# Patient Record
Sex: FEMALE | Race: WHITE | NOT HISPANIC OR LATINO | URBAN - METROPOLITAN AREA
[De-identification: names, ages, dates, MRNs, and addresses within clinical notes are randomized per-mention and may not be internally consistent; named-entity substitution may affect disease eponyms.]

---

## 2023-04-07 ENCOUNTER — OFFICE VISIT (OUTPATIENT)
Dept: PRIMARY CARE | Facility: CLINIC | Age: 73
End: 2023-04-07
Payer: MEDICARE

## 2023-04-07 VITALS
HEART RATE: 82 BPM | DIASTOLIC BLOOD PRESSURE: 74 MMHG | WEIGHT: 217.6 LBS | RESPIRATION RATE: 22 BRPM | BODY MASS INDEX: 40.04 KG/M2 | OXYGEN SATURATION: 95 % | SYSTOLIC BLOOD PRESSURE: 115 MMHG | HEIGHT: 62 IN

## 2023-04-07 DIAGNOSIS — K59.00 CONSTIPATION, UNSPECIFIED CONSTIPATION TYPE: Primary | ICD-10-CM

## 2023-04-07 DIAGNOSIS — R10.84 GENERALIZED ABDOMINAL PAIN: ICD-10-CM

## 2023-04-07 DIAGNOSIS — R14.0 ABDOMINAL DISTENSION: ICD-10-CM

## 2023-04-07 PROBLEM — J45.909 ASTHMA (HHS-HCC): Status: ACTIVE | Noted: 2023-04-07

## 2023-04-07 PROBLEM — R73.03 PREDIABETES: Status: ACTIVE | Noted: 2023-04-07

## 2023-04-07 PROBLEM — R30.0 DYSURIA: Status: ACTIVE | Noted: 2023-04-07

## 2023-04-07 PROBLEM — M19.90 OSTEOARTHRITIS: Status: ACTIVE | Noted: 2023-04-07

## 2023-04-07 PROBLEM — I35.0 MILD AORTIC STENOSIS: Status: ACTIVE | Noted: 2023-04-07

## 2023-04-07 PROBLEM — R73.01 ELEVATED FASTING GLUCOSE: Status: ACTIVE | Noted: 2023-04-07

## 2023-04-07 PROBLEM — I10 HYPERTENSION: Status: ACTIVE | Noted: 2023-04-07

## 2023-04-07 PROBLEM — R01.1 MURMUR: Status: ACTIVE | Noted: 2023-04-07

## 2023-04-07 PROBLEM — R06.02 SHORTNESS OF BREATH: Status: ACTIVE | Noted: 2023-04-07

## 2023-04-07 PROBLEM — E66.9 OBESITY: Status: ACTIVE | Noted: 2023-04-07

## 2023-04-07 PROBLEM — M25.551 RIGHT HIP PAIN: Status: ACTIVE | Noted: 2023-04-07

## 2023-04-07 PROBLEM — F41.9 ANXIETY DISORDER: Status: ACTIVE | Noted: 2023-04-07

## 2023-04-07 PROBLEM — M54.50 LOW BACK PAIN: Status: ACTIVE | Noted: 2023-04-07

## 2023-04-07 PROBLEM — M16.11 OSTEOARTHRITIS OF RIGHT HIP: Status: ACTIVE | Noted: 2023-04-07

## 2023-04-07 PROBLEM — E55.9 VITAMIN D DEFICIENCY: Status: ACTIVE | Noted: 2023-04-07

## 2023-04-07 PROBLEM — E78.5 HYPERLIPIDEMIA: Status: ACTIVE | Noted: 2023-04-07

## 2023-04-07 PROCEDURE — 1159F MED LIST DOCD IN RCRD: CPT | Performed by: STUDENT IN AN ORGANIZED HEALTH CARE EDUCATION/TRAINING PROGRAM

## 2023-04-07 PROCEDURE — 99214 OFFICE O/P EST MOD 30 MIN: CPT | Performed by: STUDENT IN AN ORGANIZED HEALTH CARE EDUCATION/TRAINING PROGRAM

## 2023-04-07 PROCEDURE — 3078F DIAST BP <80 MM HG: CPT | Performed by: STUDENT IN AN ORGANIZED HEALTH CARE EDUCATION/TRAINING PROGRAM

## 2023-04-07 PROCEDURE — 3074F SYST BP LT 130 MM HG: CPT | Performed by: STUDENT IN AN ORGANIZED HEALTH CARE EDUCATION/TRAINING PROGRAM

## 2023-04-07 PROCEDURE — 1036F TOBACCO NON-USER: CPT | Performed by: STUDENT IN AN ORGANIZED HEALTH CARE EDUCATION/TRAINING PROGRAM

## 2023-04-07 PROCEDURE — 1160F RVW MEDS BY RX/DR IN RCRD: CPT | Performed by: STUDENT IN AN ORGANIZED HEALTH CARE EDUCATION/TRAINING PROGRAM

## 2023-04-07 RX ORDER — HYDROCODONE BITARTRATE AND ACETAMINOPHEN 5; 325 MG/1; MG/1
1 TABLET ORAL
COMMUNITY
Start: 2023-03-29

## 2023-04-07 RX ORDER — LISINOPRIL 10 MG/1
1 TABLET ORAL DAILY
COMMUNITY
Start: 2019-04-16 | End: 2023-09-08 | Stop reason: SDUPTHER

## 2023-04-07 RX ORDER — HYDROCHLOROTHIAZIDE 25 MG/1
1 TABLET ORAL DAILY
COMMUNITY
Start: 2013-06-17 | End: 2023-09-08 | Stop reason: SDUPTHER

## 2023-04-07 RX ORDER — MOMETASONE FUROATE AND FORMOTEROL FUMARATE DIHYDRATE 200; 5 UG/1; UG/1
2 AEROSOL RESPIRATORY (INHALATION) 2 TIMES DAILY
COMMUNITY
Start: 2022-05-31

## 2023-04-07 RX ORDER — GABAPENTIN 300 MG/1
1 CAPSULE ORAL DAILY
COMMUNITY
Start: 2020-09-22 | End: 2023-09-08 | Stop reason: SDUPTHER

## 2023-04-07 RX ORDER — CITALOPRAM 20 MG/1
1 TABLET, FILM COATED ORAL DAILY
COMMUNITY
Start: 2013-01-16 | End: 2023-09-08 | Stop reason: SDUPTHER

## 2023-04-07 RX ORDER — DEXTROMETHORPHAN POLISTIREX 30 MG/5 ML
1 SUSPENSION, EXTENDED RELEASE 12 HR ORAL ONCE
Qty: 266 ML | Refills: 0 | Status: SHIPPED | OUTPATIENT
Start: 2023-04-07 | End: 2023-04-07

## 2023-04-07 RX ORDER — MELOXICAM 15 MG/1
1 TABLET ORAL DAILY
COMMUNITY
Start: 2020-05-01 | End: 2023-09-08 | Stop reason: SDUPTHER

## 2023-04-07 RX ORDER — VERAPAMIL HYDROCHLORIDE 240 MG/1
1 CAPSULE, EXTENDED RELEASE ORAL DAILY
COMMUNITY
Start: 2022-07-18 | End: 2023-07-20 | Stop reason: SDUPTHER

## 2023-04-07 RX ORDER — ACETAMINOPHEN 500 MG
650 TABLET ORAL EVERY 8 HOURS PRN
COMMUNITY

## 2023-04-07 RX ORDER — ATORVASTATIN CALCIUM 20 MG/1
1 TABLET, FILM COATED ORAL NIGHTLY
COMMUNITY
Start: 2012-04-12 | End: 2023-09-08 | Stop reason: SDUPTHER

## 2023-04-07 RX ORDER — POLYETHYLENE GLYCOL 3350 17 G/17G
17 POWDER, FOR SOLUTION ORAL DAILY
Qty: 595 G | Refills: 0 | Status: SHIPPED | OUTPATIENT
Start: 2023-04-07

## 2023-04-07 RX ORDER — ALBUTEROL SULFATE 90 UG/1
AEROSOL, METERED RESPIRATORY (INHALATION)
COMMUNITY
Start: 2012-09-10

## 2023-04-07 RX ORDER — SEMAGLUTIDE 2.68 MG/ML
2 INJECTION, SOLUTION SUBCUTANEOUS
COMMUNITY
Start: 2022-10-25 | End: 2023-05-15 | Stop reason: SDUPTHER

## 2023-04-07 ASSESSMENT — ENCOUNTER SYMPTOMS
DYSURIA: 0
ABDOMINAL PAIN: 1
DIARRHEA: 1
SHORTNESS OF BREATH: 0
RHINORRHEA: 0
CONSTIPATION: 1
MYALGIAS: 0
FATIGUE: 0
NAUSEA: 0
COUGH: 0
CHILLS: 0
ARTHRALGIAS: 0
FEVER: 0
VOMITING: 0

## 2023-04-07 ASSESSMENT — PAIN SCALES - GENERAL: PAINLEVEL: 4

## 2023-04-07 NOTE — PROGRESS NOTES
"Subjective   Patient ID: Ghazal Mancini is a 73 y.o. female who presents for Abdominal Pain (9:00 Tuesday night, bowel impaction/disimpacted around 2p Wednesday; experiencing  sharp pain/discomfort).    Abdominal Pain  Associated symptoms include constipation and diarrhea. Pertinent negatives include no arthralgias, dysuria, fever, myalgias, nausea or vomiting.        She was in the ER 3 days ago for impaction. This was in [facility]. She was able to eliminate on her own and then left the ER. She was not even seen and no tests were done.  She was able to sleep and had some diarrhea.  She drove back home here and she is still having abdominal pain.  She has been using hemorrhoid cream.  She does not take anything for constipation.    Review of Systems   Constitutional:  Negative for chills, fatigue and fever.   HENT:  Negative for congestion and rhinorrhea.    Respiratory:  Negative for cough and shortness of breath.    Cardiovascular:  Negative for chest pain.   Gastrointestinal:  Positive for abdominal pain, constipation and diarrhea. Negative for nausea and vomiting.   Genitourinary:  Negative for dysuria.   Musculoskeletal:  Negative for arthralgias and myalgias.       Objective   /74   Pulse 82   Resp 22   Ht 1.575 m (5' 2\")   Wt 98.7 kg (217 lb 9.6 oz)   SpO2 95%   BMI 39.80 kg/m²     Physical Exam  Vitals and nursing note reviewed.   Constitutional:       General: She is in acute distress.      Appearance: Normal appearance. She is ill-appearing and toxic-appearing.      Comments: Appears uncomfortable.   HENT:      Head: Normocephalic and atraumatic.   Cardiovascular:      Rate and Rhythm: Normal rate and regular rhythm.      Heart sounds: Normal heart sounds.   Pulmonary:      Effort: Pulmonary effort is normal.      Breath sounds: Normal breath sounds.   Abdominal:      General: Bowel sounds are normal. There is distension (Mild).      Palpations: Abdomen is soft.      Tenderness: There is " abdominal tenderness (Generalized.  Particularly in distribution of last portion of colon). There is no guarding or rebound.         Assessment/Plan   Problem List Items Addressed This Visit    None  Visit Diagnoses       Constipation, unspecified constipation type    -  Primary    Relevant Medications    mineral oil enema    polyethylene glycol (Glycolax) 17 gram/dose powder    Other Relevant Orders    XR abdomen 1 view    Generalized abdominal pain        Relevant Medications    mineral oil enema    Other Relevant Orders    XR abdomen 1 view    Abdominal distension        Relevant Medications    mineral oil enema    Other Relevant Orders    XR abdomen 1 view          Patient with increasing constipation.  History of impaction.  Was seen in the ER but left without being seen as she did have a bowel movement.  Ordered KUB to assess for stool burden.  Discussed continued use of MiraLAX to relieve constipation.  We will first perform mineral oil enema as ordered.  Patient can get this at the pharmacy.  Discussed other over-the-counter medications as well as dietary modifications and changes to help relieve symptoms in the future.  Discussed signs and symptoms that would require reevaluation in the office versus immediate treatment in the emergency department.  She is understanding and agreeable with plan.

## 2023-05-15 ENCOUNTER — TELEMEDICINE (OUTPATIENT)
Dept: PHARMACY | Facility: HOSPITAL | Age: 73
End: 2023-05-15
Payer: MEDICARE

## 2023-05-15 DIAGNOSIS — R73.01 ELEVATED FASTING GLUCOSE: Primary | ICD-10-CM

## 2023-05-15 DIAGNOSIS — R73.01 ELEVATED FASTING GLUCOSE: ICD-10-CM

## 2023-05-15 RX ORDER — SEMAGLUTIDE 2.68 MG/ML
2 INJECTION, SOLUTION SUBCUTANEOUS
Qty: 9 ML | Refills: 0 | Status: SHIPPED | OUTPATIENT
Start: 2023-05-15 | End: 2023-09-13 | Stop reason: ALTCHOICE

## 2023-05-15 NOTE — PROGRESS NOTES
Subjective   Patient ID: Ghazal Mancini is a 73 y.o. female who presents for Weight Loss and Hyperglycemia.    Referring Provider: Becka Foreman DO     Pt presents to the pharmacy team for Ozempic f/u that can assist with weight loss. Per her insurance, medications such as Wegovy, Saxenda, Contrave, Qsymia are not covered. She started Ozempic 9 months ago, titrated up to the 2 mg dose early in December. No issues or adverse effects after taking the 2 mg dose. She has noticed a change in weight loss, now down to 217 lbs since last office visit this past month, a decrease of 28 lbs. Some of this weight loss pt attributes to activity change. No other concerns at this time    Objective     There were no vitals taken for this visit.     Labs  Lab Results   Component Value Date    BILITOT 0.5 07/15/2022    CALCIUM 10.1 07/15/2022    CO2 28 07/15/2022     07/15/2022    CREATININE 0.91 07/15/2022    GLUCOSE 95 07/15/2022    ALKPHOS 68 07/15/2022    K 4.8 07/15/2022    PROT 7.1 07/15/2022     07/15/2022    AST 26 07/15/2022    ALT 25 07/15/2022    BUN 22 07/15/2022    ANIONGAP 16 07/15/2022    ALBUMIN 4.3 07/15/2022    GFRF 66 07/15/2022     Lab Results   Component Value Date    TRIG 180 (H) 07/15/2022    CHOL 151 07/15/2022    HDL 44.4 07/15/2022     Lab Results   Component Value Date    HGBA1C 6.2 03/25/2021       Current Outpatient Medications on File Prior to Visit   Medication Sig Dispense Refill    acetaminophen (Tylenol) 500 mg tablet Take 650 mg by mouth every 8 hours if needed.      albuterol 90 mcg/actuation inhaler INHALE 1 TO 2 PUFFS EVERY 4 TO 6 HOURS AS NEEDED.      atorvastatin (Lipitor) 20 mg tablet Take 1 tablet (20 mg) by mouth once daily at bedtime.      citalopram (CeleXA) 20 mg tablet Take 1 tablet (20 mg) by mouth once daily.      gabapentin (Neurontin) 300 mg capsule Take 1 capsule (300 mg) by mouth once daily.      hydroCHLOROthiazide (HYDRODiuril) 25 mg tablet Take 1 tablet (25 mg)  by mouth once daily.      HYDROcodone-acetaminophen (Norco) 5-325 mg tablet Take 1 tablet by mouth. Two to three times daily      lisinopril 10 mg tablet Take 1 tablet (10 mg) by mouth once daily.      meloxicam (Mobic) 15 mg tablet Take 1 tablet (15 mg) by mouth once daily.      mometasone-formoterol (Dulera) 200-5 mcg/actuation inhaler Inhale 2 puffs  in the morning and 2 puffs in the evening.      polyethylene glycol (Glycolax) 17 gram/dose powder Take 17 g by mouth once daily. 595 g 0    semaglutide (Ozempic) 2 mg/dose (8 mg/3 mL) pen injector Inject 2 mg under the skin 1 (one) time per week.      verapamil ER (Veralan PM) 240 mg 24 hr capsule Take 1 capsule (240 mg) by mouth once daily.       No current facility-administered medications on file prior to visit.        Assessment/Plan   Problem List Items Addressed This Visit       Elevated fasting glucose     1. Continue taking Ozempic 2 mg weekly indefinitely, pt tolerating well  2. Continue all other meds as prescribed by PCP    Clinical Pharmacist follow-up: as needed    Dustin Forbes, PharmD, [facility]  Clinical Pharmacist     Verbal consent to manage patient’s drug therapy was obtained from the patient. They were informed they may decline to participate or withdraw from participation in pharmacy services at any time.

## 2023-05-19 ENCOUNTER — APPOINTMENT (OUTPATIENT)
Dept: PRIMARY CARE | Facility: CLINIC | Age: 73
End: 2023-05-19
Payer: MEDICARE

## 2023-05-26 ENCOUNTER — OFFICE VISIT (OUTPATIENT)
Dept: PRIMARY CARE | Facility: CLINIC | Age: 73
End: 2023-05-26
Payer: MEDICARE

## 2023-05-26 VITALS
HEART RATE: 75 BPM | WEIGHT: 230 LBS | SYSTOLIC BLOOD PRESSURE: 142 MMHG | DIASTOLIC BLOOD PRESSURE: 60 MMHG | BODY MASS INDEX: 42.07 KG/M2

## 2023-05-26 DIAGNOSIS — B35.6 TINEA CRURIS: ICD-10-CM

## 2023-05-26 DIAGNOSIS — F41.1 GENERALIZED ANXIETY DISORDER: Primary | ICD-10-CM

## 2023-05-26 PROBLEM — M15.9 GENERALIZED OSTEOARTHROSIS: Status: ACTIVE | Noted: 2019-10-15

## 2023-05-26 PROBLEM — M79.10 MUSCLE PAIN: Status: ACTIVE | Noted: 2023-05-26

## 2023-05-26 PROBLEM — M54.16 LUMBAR RADICULOPATHY: Status: ACTIVE | Noted: 2023-05-26

## 2023-05-26 PROBLEM — M81.0 AGE RELATED OSTEOPOROSIS: Status: ACTIVE | Noted: 2019-10-15

## 2023-05-26 PROBLEM — G89.4 CHRONIC PAIN SYNDROME: Status: ACTIVE | Noted: 2023-05-26

## 2023-05-26 PROBLEM — F43.23 ADJUSTMENT DISORDER WITH MIXED ANXIETY AND DEPRESSED MOOD: Status: ACTIVE | Noted: 2023-05-26

## 2023-05-26 PROCEDURE — 1159F MED LIST DOCD IN RCRD: CPT | Performed by: FAMILY MEDICINE

## 2023-05-26 PROCEDURE — 1160F RVW MEDS BY RX/DR IN RCRD: CPT | Performed by: FAMILY MEDICINE

## 2023-05-26 PROCEDURE — 99214 OFFICE O/P EST MOD 30 MIN: CPT | Performed by: FAMILY MEDICINE

## 2023-05-26 PROCEDURE — 1036F TOBACCO NON-USER: CPT | Performed by: FAMILY MEDICINE

## 2023-05-26 PROCEDURE — 3077F SYST BP >= 140 MM HG: CPT | Performed by: FAMILY MEDICINE

## 2023-05-26 PROCEDURE — 3078F DIAST BP <80 MM HG: CPT | Performed by: FAMILY MEDICINE

## 2023-05-26 RX ORDER — NYSTATIN 100000 [USP'U]/G
POWDER TOPICAL 2 TIMES DAILY
Qty: 60 G | Refills: 3 | Status: SHIPPED | OUTPATIENT
Start: 2023-05-26 | End: 2024-01-18 | Stop reason: SDUPTHER

## 2023-05-26 RX ORDER — BUPROPION HYDROCHLORIDE 150 MG/1
150 TABLET ORAL EVERY MORNING
Qty: 30 TABLET | Refills: 5 | Status: SHIPPED | OUTPATIENT
Start: 2023-05-26 | End: 2023-11-15 | Stop reason: SDUPTHER

## 2023-05-26 ASSESSMENT — ENCOUNTER SYMPTOMS
PALPITATIONS: 0
FEVER: 0
FACIAL ASYMMETRY: 0
NERVOUS/ANXIOUS: 1
ACTIVITY CHANGE: 0
AGITATION: 1
BACK PAIN: 0
ARTHRALGIAS: 0
FATIGUE: 0
APPETITE CHANGE: 0
LIGHT-HEADEDNESS: 0
COUGH: 0
CHEST TIGHTNESS: 0
CHOKING: 0
COLOR CHANGE: 0
HEADACHES: 0
DIZZINESS: 0

## 2023-05-26 NOTE — PROGRESS NOTES
"Subjective   Patient ID: Ghazal Mancini is a 73 y.o. female who presents for To discuss mood.     HPI   Patient reports \"I have the personality of a snake.\" She is upset because of her property is being used by a family friend for her wedding. She is upset about everything that is happening.     Review of Systems   Constitutional:  Negative for activity change, appetite change, fatigue and fever.   HENT:  Negative for congestion.    Respiratory:  Negative for cough, choking and chest tightness.    Cardiovascular:  Negative for chest pain, palpitations and leg swelling.   Musculoskeletal:  Negative for arthralgias, back pain and gait problem.   Skin:  Negative for color change and pallor.   Neurological:  Negative for dizziness, facial asymmetry, light-headedness and headaches.   Psychiatric/Behavioral:  Positive for agitation. Negative for behavioral problems. The patient is nervous/anxious.        Objective   /60 (BP Location: Right arm)   Pulse 75   Wt 104 kg (230 lb)   BMI 42.07 kg/m²   BSA Body surface area is 2.13 meters squared.      Physical Exam  Constitutional:       General: She is not in acute distress.     Appearance: Normal appearance. She is not toxic-appearing.   HENT:      Head: Normocephalic.      Right Ear: Tympanic membrane, ear canal and external ear normal.      Left Ear: Tympanic membrane, ear canal and external ear normal.      Mouth/Throat:      Pharynx: Oropharynx is clear.   Eyes:      Conjunctiva/sclera: Conjunctivae normal.      Pupils: Pupils are equal, round, and reactive to light.   Cardiovascular:      Rate and Rhythm: Normal rate and regular rhythm.      Pulses: Normal pulses.      Heart sounds: Normal heart sounds.   Pulmonary:      Effort: No respiratory distress.      Breath sounds: No wheezing, rhonchi or rales.   Musculoskeletal:         General: No swelling or tenderness.      Cervical back: No tenderness.   Skin:     Findings: No lesion or rash.   Neurological:    "   General: No focal deficit present.      Mental Status: She is alert and oriented to person, place, and time. Mental status is at baseline.      Gait: Gait normal.   Psychiatric:         Mood and Affect: Mood normal.         Behavior: Behavior normal.         Thought Content: Thought content normal.         Judgment: Judgment normal.       Legacy Encounter on 07/15/2022   Component Date Value Ref Range Status    Glucose 07/15/2022 95  74 - 99 mg/dL Final    Sodium 07/15/2022 139  136 - 145 mmol/L Final    Potassium 07/15/2022 4.8  3.5 - 5.3 mmol/L Final    Chloride 07/15/2022 100  98 - 107 mmol/L Final    Bicarbonate 07/15/2022 28  21 - 32 mmol/L Final    Anion Gap 07/15/2022 16  10 - 20 mmol/L Final    Urea Nitrogen 07/15/2022 22  6 - 23 mg/dL Final    Creatinine 07/15/2022 0.91  0.50 - 1.05 mg/dL Final    GFR Female 07/15/2022 66  >90 mL/min/1.73m2 Final    Comment:  CALCULATIONS OF ESTIMATED GFR ARE PERFORMED   USING THE 2021 CKD-EPI STUDY REFIT EQUATION   WITHOUT THE RACE VARIABLE FOR THE IDMS-TRACEABLE   CREATININE METHODS.    https://jasn.asnjournals.org/content/early/2021/09/22/ASN.3088526484      Calcium 07/15/2022 10.1  8.6 - 10.6 mg/dL Final    Albumin 07/15/2022 4.3  3.4 - 5.0 g/dL Final    Alkaline Phosphatase 07/15/2022 68  33 - 136 U/L Final    Total Protein 07/15/2022 7.1  6.4 - 8.2 g/dL Final    AST 07/15/2022 26  9 - 39 U/L Final    Total Bilirubin 07/15/2022 0.5  0.0 - 1.2 mg/dL Final    ALT (SGPT) 07/15/2022 25  7 - 45 U/L Final    Comment:  Patients treated with Sulfasalazine may generate    falsely decreased results for ALT.      Cholesterol 07/15/2022 151  0 - 199 mg/dL Final    Comment: .      AGE      DESIRABLE   BORDERLINE HIGH   HIGH     0-19 Y     0 - 169       170 - 199     >/= 200    20-24 Y     0 - 189       190 - 224     >/= 225         >24 Y     0 - 199       200 - 239     >/= 240   **All ranges are based on fasting samples. Specific   therapeutic targets will vary based on  patient-specific   cardiac risk.  .   Pediatric guidelines reference:Pediatrics 2011, 128(S5).   Adult guidelines reference: NCEP ATPIII Guidelines,     PALOMO 2001, 258:2486-97  .   Venipuncture immediately after or during the    administration of Metamizole may lead to falsely   low results. Testing should be performed immediately   prior to Metamizole dosing.      HDL 07/15/2022 44.4  mg/dL Final    Comment: .      AGE      VERY LOW   LOW     NORMAL    HIGH       0-19 Y       < 35   < 40     40-45     ----    20-24 Y       ----   < 40       >45     ----      >24 Y       ----   < 40     40-60      >60  .      Cholesterol/HDL Ratio 07/15/2022 3.4   Final    Comment: REF VALUES  DESIRABLE  < 3.4  HIGH RISK  > 5.0      LDL 07/15/2022 71  0 - 99 mg/dL Final    Comment: .                           NEAR      BORD      AGE      DESIRABLE  OPTIMAL    HIGH     HIGH     VERY HIGH     0-19 Y     0 - 109     ---    110-129   >/= 130     ----    20-24 Y     0 - 119     ---    120-159   >/= 160     ----      >24 Y     0 -  99   100-129  130-159   160-189     >/=190  .      VLDL 07/15/2022 36  0 - 40 mg/dL Final    Triglycerides 07/15/2022 180 (H)  0 - 149 mg/dL Final    Comment: .      AGE      DESIRABLE   BORDERLINE HIGH   HIGH     VERY HIGH   0 D-90 D    19 - 174         ----         ----        ----  91 D- 9 Y     0 -  74        75 -  99     >/= 100      ----    10-19 Y     0 -  89        90 - 129     >/= 130      ----    20-24 Y     0 - 114       115 - 149     >/= 150      ----         >24 Y     0 - 149       150 - 199    200- 499    >/= 500  .   Venipuncture immediately after or during the    administration of Metamizole may lead to falsely   low results. Testing should be performed immediately   prior to Metamizole dosing.      Vitamin D, 25-Hydroxy 07/15/2022 42  ng/mL Final    Comment: .  DEFICIENCY:         < 20   NG/ML  INSUFFICIENCY:      20-29  NG/ML  SUFFICIENCY:         NG/ML    THIS ASSAY ACCURATELY  QUANTIFIES THE SUM OF  VITAMIN D3, 25-HYDROXY AND VIT D2,25-HYDROXY.      TSH 07/15/2022 1.73  0.44 - 3.98 mIU/L Final    Comment:  TSH testing is performed using different testing    methodology at [facility] than at other    [facility]. Direct result comparisons should    only be made within the same method.      WBC 07/15/2022 6.0  4.4 - 11.3 x10E9/L Final    nRBC 07/15/2022 0.0  0.0 - 0.0 /100 WBC Final    RBC 07/15/2022 4.22  4.00 - 5.20 x10E12/L Final    Hemoglobin 07/15/2022 12.4  12.0 - 16.0 g/dL Final    Hematocrit 07/15/2022 39.2  36.0 - 46.0 % Final    MCV 07/15/2022 93  80 - 100 fL Final    MCHC 07/15/2022 31.6 (L)  32.0 - 36.0 g/dL Final    Platelets 07/15/2022 242  150 - 450 x10E9/L Final    RDW 07/15/2022 13.8  11.5 - 14.5 % Final    Neutrophils % 07/15/2022 62.3  40.0 - 80.0 % Final    Immature Granulocytes %, Automated 07/15/2022 0.2  0.0 - 0.9 % Final    Comment:  Immature Granulocyte Count (IG) includes promyelocytes,    myelocytes and metamyelocytes but does not include bands.   Percent differential counts (%) should be interpreted in the   context of the absolute cell counts (cells/L).      Lymphocytes % 07/15/2022 27.5  13.0 - 44.0 % Final    Monocytes % 07/15/2022 8.5  2.0 - 10.0 % Final    Eosinophils % 07/15/2022 0.7  0.0 - 6.0 % Final    Basophils % 07/15/2022 0.8  0.0 - 2.0 % Final    Neutrophils Absolute 07/15/2022 3.76  1.60 - 5.50 x10E9/L Final    Lymphocytes Absolute 07/15/2022 1.66  0.80 - 3.00 x10E9/L Final    Monocytes Absolute 07/15/2022 0.51  0.05 - 0.80 x10E9/L Final    Eosinophils Absolute 07/15/2022 0.04  0.00 - 0.40 x10E9/L Final    Basophils Absolute 07/15/2022 0.05  0.00 - 0.10 x10E9/L Final     Current Outpatient Medications on File Prior to Visit   Medication Sig Dispense Refill    acetaminophen (Tylenol) 500 mg tablet Take 650 mg by mouth every 8 hours if needed.      albuterol 90 mcg/actuation inhaler INHALE 1 TO 2 PUFFS EVERY 4 TO 6 HOURS AS NEEDED.       atorvastatin (Lipitor) 20 mg tablet Take 1 tablet (20 mg) by mouth once daily at bedtime.      citalopram (CeleXA) 20 mg tablet Take 1 tablet (20 mg) by mouth once daily.      gabapentin (Neurontin) 300 mg capsule Take 1 capsule (300 mg) by mouth once daily.      hydroCHLOROthiazide (HYDRODiuril) 25 mg tablet Take 1 tablet (25 mg) by mouth once daily.      HYDROcodone-acetaminophen (Norco) 5-325 mg tablet Take 1 tablet by mouth. Two to three times daily      lisinopril 10 mg tablet Take 1 tablet (10 mg) by mouth once daily.      meloxicam (Mobic) 15 mg tablet Take 1 tablet (15 mg) by mouth once daily.      polyethylene glycol (Glycolax) 17 gram/dose powder Take 17 g by mouth once daily. 595 g 0    semaglutide (Ozempic) 2 mg/dose (8 mg/3 mL) pen injector Inject 2 mg under the skin 1 (one) time per week. 9 mL 0    verapamil ER (Veralan PM) 240 mg 24 hr capsule Take 1 capsule (240 mg) by mouth once daily.      mometasone-formoterol (Dulera) 200-5 mcg/actuation inhaler Inhale 2 puffs twice a day.       No current facility-administered medications on file prior to visit.     No images are attached to the encounter.            Assessment/Plan

## 2023-06-30 ENCOUNTER — TELEPHONE (OUTPATIENT)
Dept: PRIMARY CARE | Facility: CLINIC | Age: 73
End: 2023-06-30
Payer: MEDICARE

## 2023-06-30 NOTE — TELEPHONE ENCOUNTER
Please schedule patient for [facility] wellness visit after 7/19/2023.    Thank you-  Emanuel Diamond CMA  6/30/2023  Practice Supervisor  [facility]

## 2023-07-03 ENCOUNTER — TELEPHONE (OUTPATIENT)
Dept: PRIMARY CARE | Facility: CLINIC | Age: 73
End: 2023-07-03
Payer: MEDICARE

## 2023-07-03 DIAGNOSIS — I10 HYPERTENSION, UNSPECIFIED TYPE: ICD-10-CM

## 2023-07-03 DIAGNOSIS — R73.03 PREDIABETES: ICD-10-CM

## 2023-07-03 DIAGNOSIS — R73.01 ELEVATED FASTING GLUCOSE: ICD-10-CM

## 2023-07-03 DIAGNOSIS — M19.90 OSTEOARTHRITIS, UNSPECIFIED OSTEOARTHRITIS TYPE, UNSPECIFIED SITE: ICD-10-CM

## 2023-07-03 DIAGNOSIS — E78.5 HYPERLIPIDEMIA, UNSPECIFIED HYPERLIPIDEMIA TYPE: ICD-10-CM

## 2023-07-03 DIAGNOSIS — M81.0 AGE RELATED OSTEOPOROSIS, UNSPECIFIED PATHOLOGICAL FRACTURE PRESENCE: ICD-10-CM

## 2023-07-03 DIAGNOSIS — E55.9 VITAMIN D DEFICIENCY: ICD-10-CM

## 2023-07-20 DIAGNOSIS — I10 HYPERTENSION, UNSPECIFIED TYPE: ICD-10-CM

## 2023-07-20 RX ORDER — VERAPAMIL HYDROCHLORIDE 240 MG/1
240 CAPSULE, EXTENDED RELEASE ORAL DAILY
Qty: 90 CAPSULE | Refills: 0 | Status: SHIPPED | OUTPATIENT
Start: 2023-07-20 | End: 2023-10-23 | Stop reason: SDUPTHER

## 2023-07-20 NOTE — TELEPHONE ENCOUNTER
Refill request for Verapamil  mg, 1 cap daily. # 90     Pended to [facility].     Has upcoming appt next month.

## 2023-08-08 ENCOUNTER — APPOINTMENT (OUTPATIENT)
Dept: PRIMARY CARE | Facility: CLINIC | Age: 73
End: 2023-08-08
Payer: MEDICARE

## 2023-09-05 ENCOUNTER — APPOINTMENT (OUTPATIENT)
Dept: PRIMARY CARE | Facility: CLINIC | Age: 73
End: 2023-09-05
Payer: MEDICARE

## 2023-09-08 ENCOUNTER — TELEPHONE (OUTPATIENT)
Dept: PRIMARY CARE | Facility: CLINIC | Age: 73
End: 2023-09-08
Payer: MEDICARE

## 2023-09-08 DIAGNOSIS — M54.16 LUMBAR RADICULOPATHY: ICD-10-CM

## 2023-09-08 DIAGNOSIS — I10 HYPERTENSION, UNSPECIFIED TYPE: ICD-10-CM

## 2023-09-08 DIAGNOSIS — E78.5 HYPERLIPIDEMIA, UNSPECIFIED HYPERLIPIDEMIA TYPE: ICD-10-CM

## 2023-09-08 DIAGNOSIS — F41.9 ANXIETY DISORDER, UNSPECIFIED TYPE: ICD-10-CM

## 2023-09-08 RX ORDER — CITALOPRAM 20 MG/1
20 TABLET, FILM COATED ORAL DAILY
Qty: 90 TABLET | Refills: 0 | Status: SHIPPED | OUTPATIENT
Start: 2023-09-08 | End: 2023-12-15

## 2023-09-08 RX ORDER — ATORVASTATIN CALCIUM 20 MG/1
20 TABLET, FILM COATED ORAL NIGHTLY
Qty: 90 TABLET | Refills: 0 | Status: SHIPPED | OUTPATIENT
Start: 2023-09-08 | End: 2023-12-15

## 2023-09-08 RX ORDER — HYDROCHLOROTHIAZIDE 25 MG/1
25 TABLET ORAL DAILY
Qty: 90 TABLET | Refills: 0 | Status: SHIPPED | OUTPATIENT
Start: 2023-09-08 | End: 2023-12-15

## 2023-09-08 RX ORDER — LISINOPRIL 10 MG/1
10 TABLET ORAL DAILY
Qty: 90 TABLET | Refills: 0 | Status: SHIPPED | OUTPATIENT
Start: 2023-09-08 | End: 2023-12-15

## 2023-09-08 RX ORDER — GABAPENTIN 300 MG/1
300 CAPSULE ORAL DAILY
Qty: 90 CAPSULE | Refills: 0 | Status: SHIPPED | OUTPATIENT
Start: 2023-09-08 | End: 2023-12-15

## 2023-09-08 RX ORDER — MELOXICAM 15 MG/1
15 TABLET ORAL DAILY
Qty: 90 TABLET | Refills: 0 | Status: SHIPPED | OUTPATIENT
Start: 2023-09-08 | End: 2023-12-15

## 2023-09-08 NOTE — TELEPHONE ENCOUNTER
Meloxicam 15mg 1x day qty 90  Gabapentin 300 mg 1 x day qty 90  Lisinopril 10 mg 1x day aty 90  Hydrochorot 25 mg 1x day qty 90  Atorvastatin 20 mg 1 at bed time qty 90  Citalopram 20 mg 1x day qty 90  [facility]

## 2023-09-11 ENCOUNTER — APPOINTMENT (OUTPATIENT)
Dept: PRIMARY CARE | Facility: CLINIC | Age: 73
End: 2023-09-11
Payer: MEDICARE

## 2023-09-13 DIAGNOSIS — R73.01 ELEVATED FASTING GLUCOSE: Primary | ICD-10-CM

## 2023-09-13 RX ORDER — SEMAGLUTIDE 2.68 MG/ML
2 INJECTION, SOLUTION SUBCUTANEOUS
Qty: 9 ML | Refills: 1 | Status: SHIPPED | OUTPATIENT
Start: 2023-09-13 | End: 2024-03-27

## 2023-09-19 ENCOUNTER — APPOINTMENT (OUTPATIENT)
Dept: PRIMARY CARE | Facility: CLINIC | Age: 73
End: 2023-09-19
Payer: MEDICARE

## 2023-10-13 ENCOUNTER — LAB (OUTPATIENT)
Dept: LAB | Facility: LAB | Age: 73
End: 2023-10-13
Payer: MEDICARE

## 2023-10-13 DIAGNOSIS — R73.01 ELEVATED FASTING GLUCOSE: ICD-10-CM

## 2023-10-13 DIAGNOSIS — I10 HYPERTENSION, UNSPECIFIED TYPE: ICD-10-CM

## 2023-10-13 DIAGNOSIS — M81.0 AGE RELATED OSTEOPOROSIS, UNSPECIFIED PATHOLOGICAL FRACTURE PRESENCE: ICD-10-CM

## 2023-10-13 DIAGNOSIS — R73.03 PREDIABETES: ICD-10-CM

## 2023-10-13 DIAGNOSIS — E78.5 HYPERLIPIDEMIA, UNSPECIFIED HYPERLIPIDEMIA TYPE: ICD-10-CM

## 2023-10-13 DIAGNOSIS — E55.9 VITAMIN D DEFICIENCY: ICD-10-CM

## 2023-10-13 PROCEDURE — 85025 COMPLETE CBC W/AUTO DIFF WBC: CPT

## 2023-10-13 PROCEDURE — 80061 LIPID PANEL: CPT

## 2023-10-13 PROCEDURE — 82306 VITAMIN D 25 HYDROXY: CPT

## 2023-10-13 PROCEDURE — 36415 COLL VENOUS BLD VENIPUNCTURE: CPT

## 2023-10-13 PROCEDURE — 83036 HEMOGLOBIN GLYCOSYLATED A1C: CPT

## 2023-10-13 PROCEDURE — 80053 COMPREHEN METABOLIC PANEL: CPT

## 2023-10-13 PROCEDURE — 84443 ASSAY THYROID STIM HORMONE: CPT

## 2023-10-14 LAB
25(OH)D3 SERPL-MCNC: 53 NG/ML (ref 30–100)
ALBUMIN SERPL BCP-MCNC: 4.5 G/DL (ref 3.4–5)
ALP SERPL-CCNC: 62 U/L (ref 33–136)
ALT SERPL W P-5'-P-CCNC: 20 U/L (ref 7–45)
ANION GAP SERPL CALC-SCNC: 15 MMOL/L (ref 10–20)
AST SERPL W P-5'-P-CCNC: 19 U/L (ref 9–39)
BASOPHILS # BLD AUTO: 0.03 X10*3/UL (ref 0–0.1)
BASOPHILS NFR BLD AUTO: 0.4 %
BILIRUB SERPL-MCNC: 0.4 MG/DL (ref 0–1.2)
BUN SERPL-MCNC: 23 MG/DL (ref 6–23)
CALCIUM SERPL-MCNC: 10.2 MG/DL (ref 8.6–10.6)
CHLORIDE SERPL-SCNC: 101 MMOL/L (ref 98–107)
CHOLEST SERPL-MCNC: 151 MG/DL (ref 0–199)
CHOLESTEROL/HDL RATIO: 3.8
CO2 SERPL-SCNC: 27 MMOL/L (ref 21–32)
CREAT SERPL-MCNC: 0.91 MG/DL (ref 0.5–1.05)
EOSINOPHIL # BLD AUTO: 0.01 X10*3/UL (ref 0–0.4)
EOSINOPHIL NFR BLD AUTO: 0.1 %
ERYTHROCYTE [DISTWIDTH] IN BLOOD BY AUTOMATED COUNT: 12.9 % (ref 11.5–14.5)
EST. AVERAGE GLUCOSE BLD GHB EST-MCNC: 117 MG/DL
GFR SERPL CREATININE-BSD FRML MDRD: 66 ML/MIN/1.73M*2
GLUCOSE SERPL-MCNC: 106 MG/DL (ref 74–99)
HBA1C MFR BLD: 5.7 %
HCT VFR BLD AUTO: 39.6 % (ref 36–46)
HDLC SERPL-MCNC: 40.1 MG/DL
HGB BLD-MCNC: 12.5 G/DL (ref 12–16)
IMM GRANULOCYTES # BLD AUTO: 0.02 X10*3/UL (ref 0–0.5)
IMM GRANULOCYTES NFR BLD AUTO: 0.3 % (ref 0–0.9)
LDLC SERPL CALC-MCNC: 73 MG/DL
LYMPHOCYTES # BLD AUTO: 1.8 X10*3/UL (ref 0.8–3)
LYMPHOCYTES NFR BLD AUTO: 25.8 %
MCH RBC QN AUTO: 30.1 PG (ref 26–34)
MCHC RBC AUTO-ENTMCNC: 31.6 G/DL (ref 32–36)
MCV RBC AUTO: 95 FL (ref 80–100)
MONOCYTES # BLD AUTO: 0.64 X10*3/UL (ref 0.05–0.8)
MONOCYTES NFR BLD AUTO: 9.2 %
NEUTROPHILS # BLD AUTO: 4.48 X10*3/UL (ref 1.6–5.5)
NEUTROPHILS NFR BLD AUTO: 64.2 %
NON HDL CHOLESTEROL: 111 MG/DL (ref 0–149)
NRBC BLD-RTO: 0 /100 WBCS (ref 0–0)
PLATELET # BLD AUTO: 306 X10*3/UL (ref 150–450)
PMV BLD AUTO: 9.1 FL (ref 7.5–11.5)
POTASSIUM SERPL-SCNC: 4.7 MMOL/L (ref 3.5–5.3)
PROT SERPL-MCNC: 7.2 G/DL (ref 6.4–8.2)
RBC # BLD AUTO: 4.15 X10*6/UL (ref 4–5.2)
SODIUM SERPL-SCNC: 138 MMOL/L (ref 136–145)
TRIGL SERPL-MCNC: 188 MG/DL (ref 0–149)
TSH SERPL-ACNC: 1.35 MIU/L (ref 0.44–3.98)
VLDL: 38 MG/DL (ref 0–40)
WBC # BLD AUTO: 7 X10*3/UL (ref 4.4–11.3)

## 2023-10-16 ENCOUNTER — LAB (OUTPATIENT)
Dept: LAB | Facility: LAB | Age: 73
End: 2023-10-16
Payer: MEDICARE

## 2023-10-16 ENCOUNTER — OFFICE VISIT (OUTPATIENT)
Dept: PRIMARY CARE | Facility: CLINIC | Age: 73
End: 2023-10-16
Payer: MEDICARE

## 2023-10-16 VITALS
HEIGHT: 61 IN | BODY MASS INDEX: 42.1 KG/M2 | HEART RATE: 86 BPM | WEIGHT: 223 LBS | DIASTOLIC BLOOD PRESSURE: 76 MMHG | SYSTOLIC BLOOD PRESSURE: 128 MMHG

## 2023-10-16 DIAGNOSIS — Z00.00 ROUTINE GENERAL MEDICAL EXAMINATION AT HEALTH CARE FACILITY: ICD-10-CM

## 2023-10-16 DIAGNOSIS — Z78.0 ASYMPTOMATIC MENOPAUSAL STATE: ICD-10-CM

## 2023-10-16 DIAGNOSIS — A19.2 TUBERCULOSIS GENERALIZED (EXCLUDING R70): Primary | ICD-10-CM

## 2023-10-16 DIAGNOSIS — Z71.89 CARDIAC RISK COUNSELING: ICD-10-CM

## 2023-10-16 DIAGNOSIS — Z12.31 ENCOUNTER FOR SCREENING MAMMOGRAM FOR BREAST CANCER: ICD-10-CM

## 2023-10-16 DIAGNOSIS — Z13.89 ENCOUNTER FOR SCREENING FOR OTHER DISORDER: ICD-10-CM

## 2023-10-16 DIAGNOSIS — Z71.89 ADVANCE DIRECTIVE DISCUSSED WITH PATIENT: ICD-10-CM

## 2023-10-16 DIAGNOSIS — Z00.00 HEALTHCARE MAINTENANCE: Primary | ICD-10-CM

## 2023-10-16 LAB
CREAT UR-MCNC: 40.4 MG/DL (ref 20–320)
MICROALBUMIN UR-MCNC: <7 MG/L
MICROALBUMIN/CREAT UR: NORMAL MG/G{CREAT}

## 2023-10-16 PROCEDURE — 82043 UR ALBUMIN QUANTITATIVE: CPT

## 2023-10-16 PROCEDURE — 1170F FXNL STATUS ASSESSED: CPT | Performed by: FAMILY MEDICINE

## 2023-10-16 PROCEDURE — 99214 OFFICE O/P EST MOD 30 MIN: CPT | Performed by: FAMILY MEDICINE

## 2023-10-16 PROCEDURE — G0444 DEPRESSION SCREEN ANNUAL: HCPCS | Performed by: FAMILY MEDICINE

## 2023-10-16 PROCEDURE — 3074F SYST BP LT 130 MM HG: CPT | Performed by: FAMILY MEDICINE

## 2023-10-16 PROCEDURE — 1125F AMNT PAIN NOTED PAIN PRSNT: CPT | Performed by: FAMILY MEDICINE

## 2023-10-16 PROCEDURE — 99497 ADVNCD CARE PLAN 30 MIN: CPT | Performed by: FAMILY MEDICINE

## 2023-10-16 PROCEDURE — 3077F SYST BP >= 140 MM HG: CPT | Performed by: FAMILY MEDICINE

## 2023-10-16 PROCEDURE — 1159F MED LIST DOCD IN RCRD: CPT | Performed by: FAMILY MEDICINE

## 2023-10-16 PROCEDURE — 1036F TOBACCO NON-USER: CPT | Performed by: FAMILY MEDICINE

## 2023-10-16 PROCEDURE — 1160F RVW MEDS BY RX/DR IN RCRD: CPT | Performed by: FAMILY MEDICINE

## 2023-10-16 PROCEDURE — G0439 PPPS, SUBSEQ VISIT: HCPCS | Performed by: FAMILY MEDICINE

## 2023-10-16 PROCEDURE — 3078F DIAST BP <80 MM HG: CPT | Performed by: FAMILY MEDICINE

## 2023-10-16 PROCEDURE — 1158F ADVNC CARE PLAN TLK DOCD: CPT | Performed by: FAMILY MEDICINE

## 2023-10-16 PROCEDURE — G0446 INTENS BEHAVE THER CARDIO DX: HCPCS | Performed by: FAMILY MEDICINE

## 2023-10-16 PROCEDURE — 82570 ASSAY OF URINE CREATININE: CPT

## 2023-10-16 ASSESSMENT — ENCOUNTER SYMPTOMS
HEADACHES: 0
DIZZINESS: 0
ACTIVITY CHANGE: 0
PALPITATIONS: 0
CHOKING: 0
COUGH: 0
OCCASIONAL FEELINGS OF UNSTEADINESS: 0
APPETITE CHANGE: 0
FATIGUE: 0
FACIAL ASYMMETRY: 0
ARTHRALGIAS: 0
BACK PAIN: 0
DEPRESSION: 0
LIGHT-HEADEDNESS: 0
CHEST TIGHTNESS: 0
FEVER: 0
LOSS OF SENSATION IN FEET: 0
COLOR CHANGE: 0

## 2023-10-16 ASSESSMENT — ACTIVITIES OF DAILY LIVING (ADL)
BATHING: INDEPENDENT
GROCERY_SHOPPING: INDEPENDENT
MANAGING_FINANCES: INDEPENDENT
TAKING_MEDICATION: INDEPENDENT
DOING_HOUSEWORK: INDEPENDENT
DRESSING: INDEPENDENT

## 2023-10-16 ASSESSMENT — PATIENT HEALTH QUESTIONNAIRE - PHQ9
2. FEELING DOWN, DEPRESSED OR HOPELESS: NOT AT ALL
1. LITTLE INTEREST OR PLEASURE IN DOING THINGS: NOT AT ALL
SUM OF ALL RESPONSES TO PHQ9 QUESTIONS 1 AND 2: 0

## 2023-10-16 NOTE — PROGRESS NOTES
"Subjective   Reason for Visit: Ghazal Mancini is an 73 y.o. female here for a Medicare Wellness visit.     Past Medical, Surgical, and Family History reviewed and updated in chart.    Reviewed all medications by prescribing practitioner or clinical pharmacist (such as prescriptions, OTCs, herbal therapies and supplements) and documented in the medical record.    HPI  Patient presents for physical exam.      Fam Hx  Mom (65) , DMII, Rheumatic fever, valvular disease  Dad (60) , CHF, MI, HTN,      Exercise walks, mows  ETOH 2 drinks/year  Caffeine 2 cups coffee/ 2 soda  Tobacco 1 ppd x 40 years     Mammogram due   DEXA  osteopenia, due   Colonoscopy Dr. Bah  due      Patient denies other complaints at this office visit.   Patient Self Assessment of Health Status  Patient Self Assessment: Good    Nutrition and Exercise  Current Diet: Unhealthy Diet  Adequate Fluid Intake: No  Caffeine: Yes  Exercise Frequency: No Exercise    Functional Ability/Level of Safety  Cognitive Impairment Observed: No cognitive impairment observed  Cognitive Impairment Reported: No cognitive impairment reported by patient or family    Home Safety Risk Factors: No grab bars, bathroom    Patient Care Team:  Becka Foreman DO as PCP - General  Becka Foreman DO as PCP - [facility] ACO Attributed Provider     Review of Systems   Constitutional:  Negative for activity change, appetite change, fatigue and fever.   HENT:  Negative for congestion.    Respiratory:  Negative for cough, choking and chest tightness.    Cardiovascular:  Negative for chest pain, palpitations and leg swelling.   Musculoskeletal:  Negative for arthralgias, back pain and gait problem.   Skin:  Negative for color change and pallor.   Neurological:  Negative for dizziness, facial asymmetry, light-headedness and headaches.       Objective   Vitals:  /64 (BP Location: Left arm)   Pulse 86   Ht 1.537 m (5' 0.5\")   Wt 101 kg (223 lb)   " BMI 42.83 kg/m²       Physical Exam  Constitutional:       General: She is not in acute distress.     Appearance: Normal appearance. She is not toxic-appearing.   HENT:      Head: Normocephalic.      Right Ear: Tympanic membrane, ear canal and external ear normal.      Left Ear: Tympanic membrane, ear canal and external ear normal.   Eyes:      Conjunctiva/sclera: Conjunctivae normal.      Pupils: Pupils are equal, round, and reactive to light.   Cardiovascular:      Rate and Rhythm: Normal rate and regular rhythm.      Pulses: Normal pulses.      Heart sounds: Normal heart sounds.   Pulmonary:      Effort: No respiratory distress.      Breath sounds: No wheezing, rhonchi or rales.   Abdominal:      General: Bowel sounds are normal. There is no distension.      Palpations: Abdomen is soft.      Tenderness: There is no abdominal tenderness.   Musculoskeletal:         General: No swelling or tenderness.   Skin:     Findings: No lesion or rash.   Neurological:      General: No focal deficit present.      Mental Status: She is alert and oriented to person, place, and time. Mental status is at baseline.      Gait: Gait normal.   Psychiatric:         Mood and Affect: Mood normal.         Behavior: Behavior normal.         Thought Content: Thought content normal.         Judgment: Judgment normal.       CBD  Assessment/Plan   Problem List Items Addressed This Visit    None  Visit Diagnoses       Healthcare maintenance    -  Primary    Asymptomatic menopausal state        Relevant Orders    XR DEXA bone density    Encounter for screening mammogram for breast cancer        Relevant Orders    BI mammo bilateral screening tomosynthesis    Routine general medical examination at health care facility        Relevant Orders    1 Year Follow Up In [facility] - PCP - Wellness Exam        1. Patient's blood work discussed at this office visit  2. LDL goal less than 100, current LDL 73  3. Triglyceride goal less than 150,  current triglycerides 188, continue on type IV diet  4. Patient's vitamin D is back to normal, continue on vitamin D supplementation daily  5. Patient's hemoglobin A1c is 5.7, continue on no added sugar diet  6. Mammogram due 2023  7. DEXA 2015 osteopenia, due 2023  8. Colonoscopy Dr. Bah 2015 due 2025  9. Patient to call questions or concerns.

## 2023-10-16 NOTE — ACP (ADVANCE CARE PLANNING)
Confirming Previous Code Status:   Advance Care Planning Note     Discussion Date: 10/16/23   Discussion Participants: patient    The patient wishes to discuss Advance Care Planning today and the following is a brief summary of our discussion.     Patient has capacity to make their own medical decisions: Yes  Health Care Agent/Surrogate Decision Maker documented in chart: Yes  Nas wolf  Documents on file and valid:  Advance Directive/Living Will: Yes   Health Care Power of : Yes  Other:     Communication of Medical Status/Prognosis:   stable    Communication of Treatment Goals/Options:   stable    Treatment Decisions  Goals of Care: quality of life is prioritized; willing to accept low-burden treatments to achieve meaningful goals     Follow Up Plan  stable  Team Members  stable  Time Statement: Total face to face time spent on advance care planning was 5 minutes with 16 minutes spent in counseling, including the explanation.    Becka Foreman DO  10/16/2023 1:24 PM

## 2023-10-23 ENCOUNTER — TELEPHONE (OUTPATIENT)
Dept: PRIMARY CARE | Facility: CLINIC | Age: 73
End: 2023-10-23
Payer: MEDICARE

## 2023-10-23 DIAGNOSIS — I10 HYPERTENSION, UNSPECIFIED TYPE: ICD-10-CM

## 2023-10-23 RX ORDER — VERAPAMIL HYDROCHLORIDE 240 MG/1
240 CAPSULE, EXTENDED RELEASE ORAL DAILY
Qty: 90 CAPSULE | Refills: 0 | Status: SHIPPED | OUTPATIENT
Start: 2023-10-23 | End: 2023-10-23

## 2023-10-23 RX ORDER — VERAPAMIL HYDROCHLORIDE 240 MG/1
240 CAPSULE, EXTENDED RELEASE ORAL DAILY
Qty: 90 CAPSULE | Refills: 0 | Status: SHIPPED | OUTPATIENT
Start: 2023-10-23 | End: 2023-10-24 | Stop reason: SDUPTHER

## 2023-10-24 DIAGNOSIS — I10 HYPERTENSION, UNSPECIFIED TYPE: ICD-10-CM

## 2023-10-24 RX ORDER — VERAPAMIL HYDROCHLORIDE 240 MG/1
240 CAPSULE, EXTENDED RELEASE ORAL DAILY
Qty: 90 CAPSULE | Refills: 1 | Status: SHIPPED | OUTPATIENT
Start: 2023-10-24 | End: 2023-10-24

## 2023-10-24 RX ORDER — VERAPAMIL HYDROCHLORIDE 240 MG/1
240 CAPSULE, EXTENDED RELEASE ORAL DAILY
Qty: 90 CAPSULE | Refills: 1 | Status: SHIPPED | OUTPATIENT
Start: 2023-10-24 | End: 2023-10-25 | Stop reason: SDUPTHER

## 2023-10-25 ENCOUNTER — TELEPHONE (OUTPATIENT)
Dept: PRIMARY CARE | Facility: CLINIC | Age: 73
End: 2023-10-25
Payer: MEDICARE

## 2023-10-25 DIAGNOSIS — I10 HYPERTENSION, UNSPECIFIED TYPE: ICD-10-CM

## 2023-10-25 RX ORDER — VERAPAMIL HYDROCHLORIDE 240 MG/1
240 CAPSULE, EXTENDED RELEASE ORAL DAILY
Qty: 30 CAPSULE | Refills: 0 | Status: SHIPPED | OUTPATIENT
Start: 2023-10-25 | End: 2023-11-15 | Stop reason: SDUPTHER

## 2023-10-25 NOTE — TELEPHONE ENCOUNTER
30 day supply sent to [facility] pharmacy per patient request, she will call when she is due for a renewal and let the office know if she is going to continue with mail order or stick with local.

## 2023-10-25 NOTE — TELEPHONE ENCOUNTER
[facility] needs clarification on medication Verapamil, Is patient suppose to be taking Verapamil ER or Verapamil SR ?     Verapamil SR is a 24 hour capsule  Verapamil ER is a 12 hour tablet      [phone]  Ref # [id number]

## 2023-11-15 ENCOUNTER — TELEPHONE (OUTPATIENT)
Dept: PRIMARY CARE | Facility: CLINIC | Age: 73
End: 2023-11-15
Payer: MEDICARE

## 2023-11-15 DIAGNOSIS — F41.1 GENERALIZED ANXIETY DISORDER: ICD-10-CM

## 2023-11-15 DIAGNOSIS — I10 HYPERTENSION, UNSPECIFIED TYPE: ICD-10-CM

## 2023-11-15 RX ORDER — VERAPAMIL HYDROCHLORIDE 240 MG/1
240 CAPSULE, EXTENDED RELEASE ORAL DAILY
Qty: 90 CAPSULE | Refills: 0 | Status: SHIPPED | OUTPATIENT
Start: 2023-11-15 | End: 2024-02-12 | Stop reason: SDUPTHER

## 2023-11-15 RX ORDER — BUPROPION HYDROCHLORIDE 150 MG/1
150 TABLET ORAL EVERY MORNING
Qty: 90 TABLET | Refills: 0 | Status: SHIPPED | OUTPATIENT
Start: 2023-11-15 | End: 2024-02-16

## 2023-11-15 NOTE — TELEPHONE ENCOUNTER
Pt needs a refill she would like a 90 day supply    verapamil ER (Veralan PM) 240 mg 24 hr capsule     buPROPion XL (Wellbutrin XL) 150 mg 24 hr tablet       Please send to [facility]  [phone]

## 2023-12-15 DIAGNOSIS — I10 HYPERTENSION, UNSPECIFIED TYPE: ICD-10-CM

## 2023-12-15 DIAGNOSIS — F41.9 ANXIETY DISORDER, UNSPECIFIED TYPE: ICD-10-CM

## 2023-12-15 DIAGNOSIS — E78.5 HYPERLIPIDEMIA, UNSPECIFIED HYPERLIPIDEMIA TYPE: ICD-10-CM

## 2023-12-15 DIAGNOSIS — M54.16 LUMBAR RADICULOPATHY: ICD-10-CM

## 2023-12-15 RX ORDER — ATORVASTATIN CALCIUM 20 MG/1
20 TABLET, FILM COATED ORAL NIGHTLY
Qty: 90 TABLET | Refills: 0 | Status: SHIPPED | OUTPATIENT
Start: 2023-12-15 | End: 2024-02-12 | Stop reason: SDUPTHER

## 2023-12-15 RX ORDER — CITALOPRAM 20 MG/1
20 TABLET, FILM COATED ORAL DAILY
Qty: 90 TABLET | Refills: 0 | Status: SHIPPED | OUTPATIENT
Start: 2023-12-15 | End: 2024-02-12 | Stop reason: SDUPTHER

## 2023-12-15 RX ORDER — MELOXICAM 15 MG/1
15 TABLET ORAL DAILY
Qty: 90 TABLET | Refills: 0 | Status: SHIPPED | OUTPATIENT
Start: 2023-12-15 | End: 2024-02-12 | Stop reason: SDUPTHER

## 2023-12-15 RX ORDER — HYDROCHLOROTHIAZIDE 25 MG/1
25 TABLET ORAL DAILY
Qty: 90 TABLET | Refills: 0 | Status: SHIPPED | OUTPATIENT
Start: 2023-12-15 | End: 2024-02-12 | Stop reason: SDUPTHER

## 2023-12-15 RX ORDER — GABAPENTIN 300 MG/1
300 CAPSULE ORAL DAILY
Qty: 90 CAPSULE | Refills: 0 | Status: SHIPPED | OUTPATIENT
Start: 2023-12-15 | End: 2024-02-12 | Stop reason: SDUPTHER

## 2023-12-15 RX ORDER — LISINOPRIL 10 MG/1
10 TABLET ORAL DAILY
Qty: 90 TABLET | Refills: 0 | Status: SHIPPED | OUTPATIENT
Start: 2023-12-15 | End: 2024-02-12 | Stop reason: SDUPTHER

## 2024-01-18 DIAGNOSIS — B35.6 TINEA CRURIS: ICD-10-CM

## 2024-01-19 RX ORDER — NYSTATIN 100000 [USP'U]/G
POWDER TOPICAL 2 TIMES DAILY
Qty: 60 G | Refills: 3 | Status: SHIPPED | OUTPATIENT
Start: 2024-01-19 | End: 2025-01-18

## 2024-02-09 ENCOUNTER — TELEPHONE (OUTPATIENT)
Dept: PRIMARY CARE | Facility: CLINIC | Age: 74
End: 2024-02-09
Payer: MEDICARE

## 2024-02-12 DIAGNOSIS — F41.9 ANXIETY DISORDER, UNSPECIFIED TYPE: ICD-10-CM

## 2024-02-12 DIAGNOSIS — M54.16 LUMBAR RADICULOPATHY: ICD-10-CM

## 2024-02-12 DIAGNOSIS — E78.5 HYPERLIPIDEMIA, UNSPECIFIED HYPERLIPIDEMIA TYPE: ICD-10-CM

## 2024-02-12 DIAGNOSIS — I10 HYPERTENSION, UNSPECIFIED TYPE: ICD-10-CM

## 2024-02-12 RX ORDER — ATORVASTATIN CALCIUM 20 MG/1
20 TABLET, FILM COATED ORAL NIGHTLY
Qty: 90 TABLET | Refills: 3 | Status: SHIPPED | OUTPATIENT
Start: 2024-02-12

## 2024-02-12 RX ORDER — LISINOPRIL 10 MG/1
10 TABLET ORAL DAILY
Qty: 90 TABLET | Refills: 3 | Status: SHIPPED | OUTPATIENT
Start: 2024-02-12 | End: 2024-03-27 | Stop reason: SDUPTHER

## 2024-02-12 RX ORDER — GABAPENTIN 300 MG/1
300 CAPSULE ORAL DAILY
Qty: 90 CAPSULE | Refills: 3 | Status: SHIPPED | OUTPATIENT
Start: 2024-02-12

## 2024-02-12 RX ORDER — CITALOPRAM 20 MG/1
20 TABLET, FILM COATED ORAL DAILY
Qty: 90 TABLET | Refills: 3 | Status: SHIPPED | OUTPATIENT
Start: 2024-02-12

## 2024-02-12 RX ORDER — MELOXICAM 15 MG/1
15 TABLET ORAL DAILY
Qty: 90 TABLET | Refills: 0 | Status: SHIPPED | OUTPATIENT
Start: 2024-02-12

## 2024-02-12 RX ORDER — HYDROCHLOROTHIAZIDE 25 MG/1
25 TABLET ORAL DAILY
Qty: 90 TABLET | Refills: 3 | Status: SHIPPED | OUTPATIENT
Start: 2024-02-12

## 2024-02-12 RX ORDER — VERAPAMIL HYDROCHLORIDE 240 MG/1
240 CAPSULE, EXTENDED RELEASE ORAL DAILY
Qty: 90 CAPSULE | Refills: 3 | Status: SHIPPED | OUTPATIENT
Start: 2024-02-12 | End: 2024-02-23 | Stop reason: SDUPTHER

## 2024-02-15 DIAGNOSIS — F41.1 GENERALIZED ANXIETY DISORDER: ICD-10-CM

## 2024-02-15 DIAGNOSIS — I10 PRIMARY HYPERTENSION: Primary | ICD-10-CM

## 2024-02-16 RX ORDER — VERAPAMIL HYDROCHLORIDE 240 MG/1
TABLET, FILM COATED, EXTENDED RELEASE ORAL
Qty: 90 TABLET | Refills: 0 | Status: SHIPPED | OUTPATIENT
Start: 2024-02-16 | End: 2024-03-13

## 2024-02-16 RX ORDER — BUPROPION HYDROCHLORIDE 150 MG/1
TABLET ORAL
Qty: 90 TABLET | Refills: 0 | Status: SHIPPED | OUTPATIENT
Start: 2024-02-16 | End: 2024-03-08

## 2024-02-22 ENCOUNTER — TELEPHONE (OUTPATIENT)
Dept: PRIMARY CARE | Facility: CLINIC | Age: 74
End: 2024-02-22
Payer: MEDICARE

## 2024-02-22 NOTE — TELEPHONE ENCOUNTER
Pt called in requesting a refill on her medication. [facility] does not have the medication in Inventory. She would like them to be sent to her regular pharmacy.    verapamil ER (Veralan PM) 240 mg 24 hr capsule  Taken once daily  Quantity: 90.    Pharmacy name: [facility]  Pharmacy Number: [phone]      Pt number: [phone]

## 2024-02-23 DIAGNOSIS — I10 HYPERTENSION, UNSPECIFIED TYPE: ICD-10-CM

## 2024-02-23 DIAGNOSIS — I10 PRIMARY HYPERTENSION: ICD-10-CM

## 2024-02-23 RX ORDER — VERAPAMIL HYDROCHLORIDE 240 MG/1
240 CAPSULE, EXTENDED RELEASE ORAL DAILY
Qty: 90 CAPSULE | Refills: 0 | Status: SHIPPED | OUTPATIENT
Start: 2024-02-23 | End: 2024-02-26 | Stop reason: SDUPTHER

## 2024-02-26 DIAGNOSIS — I10 HYPERTENSION, UNSPECIFIED TYPE: ICD-10-CM

## 2024-02-26 RX ORDER — VERAPAMIL HYDROCHLORIDE 240 MG/1
240 CAPSULE, EXTENDED RELEASE ORAL DAILY
Qty: 90 CAPSULE | Refills: 1 | Status: SHIPPED | OUTPATIENT
Start: 2024-02-26

## 2024-03-04 ENCOUNTER — TELEPHONE (OUTPATIENT)
Dept: PRIMARY CARE | Facility: CLINIC | Age: 74
End: 2024-03-04
Payer: MEDICARE

## 2024-03-04 NOTE — TELEPHONE ENCOUNTER
Please schedule patient for [facility] wellness visit in Oct 2024. Schedule with Dr. Foreman Please send this encounter to Dr. Foreman for lab orders once scheduled.     Thank you-  Emanuel Diamond CMA  3/4/2024  Practice Supervisor  [facility]

## 2024-03-07 DIAGNOSIS — I10 PRIMARY HYPERTENSION: ICD-10-CM

## 2024-03-07 DIAGNOSIS — F41.1 GENERALIZED ANXIETY DISORDER: ICD-10-CM

## 2024-03-07 DIAGNOSIS — R73.01 ELEVATED FASTING GLUCOSE: ICD-10-CM

## 2024-03-07 DIAGNOSIS — E55.9 VITAMIN D DEFICIENCY: ICD-10-CM

## 2024-03-07 DIAGNOSIS — E78.01 FAMILIAL HYPERCHOLESTEROLEMIA: ICD-10-CM

## 2024-03-08 RX ORDER — BUPROPION HYDROCHLORIDE 150 MG/1
150 TABLET ORAL EVERY MORNING
Qty: 90 TABLET | Refills: 1 | Status: SHIPPED | OUTPATIENT
Start: 2024-03-08 | End: 2024-09-04

## 2024-03-12 DIAGNOSIS — I10 PRIMARY HYPERTENSION: ICD-10-CM

## 2024-03-13 RX ORDER — VERAPAMIL HYDROCHLORIDE 240 MG/1
240 TABLET, FILM COATED, EXTENDED RELEASE ORAL NIGHTLY
Qty: 90 TABLET | Refills: 3 | Status: SHIPPED | OUTPATIENT
Start: 2024-03-13 | End: 2024-03-27 | Stop reason: SDUPTHER

## 2024-03-27 ENCOUNTER — OFFICE VISIT (OUTPATIENT)
Dept: PRIMARY CARE | Facility: CLINIC | Age: 74
End: 2024-03-27
Payer: MEDICARE

## 2024-03-27 VITALS
HEART RATE: 87 BPM | WEIGHT: 236.8 LBS | DIASTOLIC BLOOD PRESSURE: 62 MMHG | BODY MASS INDEX: 45.49 KG/M2 | SYSTOLIC BLOOD PRESSURE: 164 MMHG

## 2024-03-27 DIAGNOSIS — I10 HYPERTENSION, UNSPECIFIED TYPE: Primary | ICD-10-CM

## 2024-03-27 PROCEDURE — 3077F SYST BP >= 140 MM HG: CPT | Performed by: FAMILY MEDICINE

## 2024-03-27 PROCEDURE — 1160F RVW MEDS BY RX/DR IN RCRD: CPT | Performed by: FAMILY MEDICINE

## 2024-03-27 PROCEDURE — 1159F MED LIST DOCD IN RCRD: CPT | Performed by: FAMILY MEDICINE

## 2024-03-27 PROCEDURE — 1036F TOBACCO NON-USER: CPT | Performed by: FAMILY MEDICINE

## 2024-03-27 PROCEDURE — 3078F DIAST BP <80 MM HG: CPT | Performed by: FAMILY MEDICINE

## 2024-03-27 PROCEDURE — 99214 OFFICE O/P EST MOD 30 MIN: CPT | Performed by: FAMILY MEDICINE

## 2024-03-27 RX ORDER — NICOTINE POLACRILEX 4 MG
1 LOZENGE BUCCAL DAILY PRN
COMMUNITY
Start: 2024-01-16

## 2024-03-27 RX ORDER — NEBULIZER AND COMPRESSOR
EACH MISCELLANEOUS
Qty: 1 EACH | Refills: 0 | Status: SHIPPED | OUTPATIENT
Start: 2024-03-27

## 2024-03-27 RX ORDER — LISINOPRIL 20 MG/1
20 TABLET ORAL DAILY
Qty: 30 TABLET | Refills: 11 | Status: SHIPPED | OUTPATIENT
Start: 2024-03-27

## 2024-03-27 ASSESSMENT — ENCOUNTER SYMPTOMS
BACK PAIN: 0
CHEST TIGHTNESS: 0
ACTIVITY CHANGE: 0
HEADACHES: 0
COUGH: 0
PALPITATIONS: 0
COLOR CHANGE: 0
APPETITE CHANGE: 0
LIGHT-HEADEDNESS: 0
ARTHRALGIAS: 0
CHOKING: 0
FACIAL ASYMMETRY: 0
FATIGUE: 0
DIZZINESS: 0
FEVER: 0

## 2024-03-27 NOTE — PROGRESS NOTES
Subjective   Patient ID: Ghazal Mancini is a 74 y.o. female who presents for Hypertension (Patient was at [facility] Dr Cardoza office for botox injections. ).    HPI   She reports she was over [facility] for Dr. Cardoza for Botox injection and her bp was reading 160/90. She is asymptomatic and not having any chest pain shortness of breath syncopal episodes or palpitations denies any nausea vomit diarrhea constipation.    Review of Systems   Constitutional:  Negative for activity change, appetite change, fatigue and fever.   HENT:  Negative for congestion.    Respiratory:  Negative for cough, choking and chest tightness.    Cardiovascular:  Negative for chest pain, palpitations and leg swelling.   Musculoskeletal:  Negative for arthralgias, back pain and gait problem.   Skin:  Negative for color change and pallor.   Neurological:  Negative for dizziness, facial asymmetry, light-headedness and headaches.       Objective   /62 (BP Location: Left arm)   Pulse 87   Wt 107 kg (236 lb 12.8 oz)   BMI 45.49 kg/m²   BSA Body surface area is 2.14 meters squared.      Physical Exam  Constitutional:       General: She is not in acute distress.     Appearance: Normal appearance. She is not toxic-appearing.   HENT:      Head: Normocephalic.      Right Ear: Tympanic membrane, ear canal and external ear normal.      Left Ear: Tympanic membrane, ear canal and external ear normal.   Eyes:      Conjunctiva/sclera: Conjunctivae normal.      Pupils: Pupils are equal, round, and reactive to light.   Cardiovascular:      Rate and Rhythm: Normal rate and regular rhythm.      Pulses: Normal pulses.      Heart sounds: Normal heart sounds.   Pulmonary:      Effort: No respiratory distress.      Breath sounds: No wheezing, rhonchi or rales.   Musculoskeletal:         General: No swelling or tenderness.   Skin:     Findings: No lesion or rash.   Neurological:      General: No focal deficit present.      Mental Status: She is  alert and oriented to person, place, and time. Mental status is at baseline.      Gait: Gait normal.   Psychiatric:         Mood and Affect: Mood normal.         Behavior: Behavior normal.         Thought Content: Thought content normal.         Judgment: Judgment normal.     Lab on 10/16/2023   Component Date Value Ref Range Status   • Albumin, Urine Random 10/16/2023 <7.0  Not established mg/L Final   • Creatinine, Urine Random 10/16/2023 40.4  20.0 - 320.0 mg/dL Final   • Albumin/Creatine Ratio 10/16/2023    Final    One or more analytes used in this calculation is outside of the analytical measurement range. Calculation cannot be performed.   Lab on 10/13/2023   Component Date Value Ref Range Status   • WBC 10/13/2023 7.0  4.4 - 11.3 x10*3/uL Final   • nRBC 10/13/2023 0.0  0.0 - 0.0 /100 WBCs Final   • RBC 10/13/2023 4.15  4.00 - 5.20 x10*6/uL Final   • Hemoglobin 10/13/2023 12.5  12.0 - 16.0 g/dL Final   • Hematocrit 10/13/2023 39.6  36.0 - 46.0 % Final   • MCV 10/13/2023 95  80 - 100 fL Final   • MCH 10/13/2023 30.1  26.0 - 34.0 pg Final   • MCHC 10/13/2023 31.6 (L)  32.0 - 36.0 g/dL Final   • RDW 10/13/2023 12.9  11.5 - 14.5 % Final   • Platelets 10/13/2023 306  150 - 450 x10*3/uL Final   • MPV 10/13/2023 9.1  7.5 - 11.5 fL Final   • Neutrophils % 10/13/2023 64.2  40.0 - 80.0 % Final   • Immature Granulocytes %, Automated 10/13/2023 0.3  0.0 - 0.9 % Final    Immature Granulocyte Count (IG) includes promyelocytes, myelocytes and metamyelocytes but does not include bands. Percent differential counts (%) should be interpreted in the context of the absolute cell counts (cells/UL).   • Lymphocytes % 10/13/2023 25.8  13.0 - 44.0 % Final   • Monocytes % 10/13/2023 9.2  2.0 - 10.0 % Final   • Eosinophils % 10/13/2023 0.1  0.0 - 6.0 % Final   • Basophils % 10/13/2023 0.4  0.0 - 2.0 % Final   • Neutrophils Absolute 10/13/2023 4.48  1.60 - 5.50 x10*3/uL Final    Percent differential counts (%) should be interpreted in  the context of the absolute cell counts (cells/uL).   • Immature Granulocytes Absolute, Au* 10/13/2023 0.02  0.00 - 0.50 x10*3/uL Final   • Lymphocytes Absolute 10/13/2023 1.80  0.80 - 3.00 x10*3/uL Final   • Monocytes Absolute 10/13/2023 0.64  0.05 - 0.80 x10*3/uL Final   • Eosinophils Absolute 10/13/2023 0.01  0.00 - 0.40 x10*3/uL Final   • Basophils Absolute 10/13/2023 0.03  0.00 - 0.10 x10*3/uL Final   • Glucose 10/13/2023 106 (H)  74 - 99 mg/dL Final   • Sodium 10/13/2023 138  136 - 145 mmol/L Final   • Potassium 10/13/2023 4.7  3.5 - 5.3 mmol/L Final   • Chloride 10/13/2023 101  98 - 107 mmol/L Final   • Bicarbonate 10/13/2023 27  21 - 32 mmol/L Final   • Anion Gap 10/13/2023 15  10 - 20 mmol/L Final   • Urea Nitrogen 10/13/2023 23  6 - 23 mg/dL Final   • Creatinine 10/13/2023 0.91  0.50 - 1.05 mg/dL Final   • eGFR 10/13/2023 66  >60 mL/min/1.73m*2 Final    Calculations of estimated GFR are performed using the 2021 CKD-EPI Study Refit equation without the race variable for the IDMS-Traceable creatinine methods.  https://jasn.asnjournals.org/content/early/2021/09/22/ASN.9352269630   • Calcium 10/13/2023 10.2  8.6 - 10.6 mg/dL Final   • Albumin 10/13/2023 4.5  3.4 - 5.0 g/dL Final   • Alkaline Phosphatase 10/13/2023 62  33 - 136 U/L Final   • Total Protein 10/13/2023 7.2  6.4 - 8.2 g/dL Final   • AST 10/13/2023 19  9 - 39 U/L Final   • Bilirubin, Total 10/13/2023 0.4  0.0 - 1.2 mg/dL Final   • ALT 10/13/2023 20  7 - 45 U/L Final    Patients treated with Sulfasalazine may generate falsely decreased results for ALT.   • Thyroid Stimulating Hormone 10/13/2023 1.35  0.44 - 3.98 mIU/L Final   • Cholesterol 10/13/2023 151  0 - 199 mg/dL Final          Age      Desirable   Borderline High   High     0-19 Y     0 - 169       170 - 199     >/= 200    20-24 Y     0 - 189       190 - 224     >/= 225         >24 Y     0 - 199       200 - 239     >/= 240   **All ranges are based on fasting samples. Specific   therapeutic  targets will vary based on patient-specific   cardiac risk.    Pediatric guidelines reference:Pediatrics 2011, 128(S5).Adult guidelines reference: NCEP ATPIII Guidelines,PALOMO 2001, 258:2486-97    Venipuncture immediately after or during the administration of Metamizole may lead to falsely low results. Testing should be performed immediately prior to Metamizole dosing.   • HDL-Cholesterol 10/13/2023 40.1  mg/dL Final      Age       Very Low   Low     Normal    High    0-19 Y    < 35      < 40     40-45     ----  20-24 Y    ----     < 40      >45      ----        >24 Y      ----     < 40     40-60      >60     • Cholesterol/HDL Ratio 10/13/2023 3.8   Final      Ref Values  Desirable  < 3.4  High Risk  > 5.0   • LDL Calculated 10/13/2023 73  <=99 mg/dL Final                                Near   Borderline      AGE      Desirable  Optimal    High     High     Very High     0-19 Y     0 - 109     ---    110-129   >/= 130     ----    20-24 Y     0 - 119     ---    120-159   >/= 160     ----      >24 Y     0 -  99   100-129  130-159   160-189     >/=190     • VLDL 10/13/2023 38  0 - 40 mg/dL Final   • Triglycerides 10/13/2023 188 (H)  0 - 149 mg/dL Final       Age         Desirable   Borderline High   High     Very High   0 D-90 D    19 - 174         ----         ----        ----  91 D- 9 Y     0 -  74        75 -  99     >/= 100      ----    10-19 Y     0 -  89        90 - 129     >/= 130      ----    20-24 Y     0 - 114       115 - 149     >/= 150      ----         >24 Y     0 - 149       150 - 199    200- 499    >/= 500    Venipuncture immediately after or during the administration of Metamizole may lead to falsely low results. Testing should be performed immediately prior to Metamizole dosing.   • Non HDL Cholesterol 10/13/2023 111  0 - 149 mg/dL Final          Age       Desirable   Borderline High   High     Very High     0-19 Y     0 - 119       120 - 144     >/= 145    >/= 160    20-24 Y     0 - 149       150 - 189      >/= 190      ----         >24 Y    30 mg/dL above LDL Cholesterol goal     • Vitamin D, 25-Hydroxy, Total 10/13/2023 53  30 - 100 ng/mL Final   • Hemoglobin A1C 10/13/2023 5.7 (H)  see below % Final   • Estimated Average Glucose 10/13/2023 117  Not Established mg/dL Final     Current Outpatient Medications on File Prior to Visit   Medication Sig Dispense Refill   • acetaminophen (Tylenol) 500 mg tablet Take 650 mg by mouth every 8 hours if needed.     • albuterol 90 mcg/actuation inhaler INHALE 1 TO 2 PUFFS EVERY 4 TO 6 HOURS AS NEEDED.     • atorvastatin (Lipitor) 20 mg tablet Take 1 tablet (20 mg) by mouth once daily at bedtime. 90 tablet 3   • buPROPion XL (Wellbutrin XL) 150 mg 24 hr tablet Take 1 tablet (150 mg) by mouth once daily in the morning. 90 tablet 1   • citalopram (CeleXA) 20 mg tablet Take 1 tablet (20 mg) by mouth once daily. 90 tablet 3   • gabapentin (Neurontin) 300 mg capsule Take 1 capsule (300 mg) by mouth once daily. 90 capsule 3   • hydroCHLOROthiazide (HYDRODiuril) 25 mg tablet Take 1 tablet (25 mg) by mouth once daily. 90 tablet 3   • HYDROcodone-acetaminophen (Norco) 5-325 mg tablet Take 1 tablet by mouth. Two to three times daily     • lisinopril 10 mg tablet Take 1 tablet (10 mg) by mouth once daily. 90 tablet 3   • meloxicam (Mobic) 15 mg tablet Take 1 tablet (15 mg) by mouth once daily. 90 tablet 0   • nystatin (Mycostatin) 100,000 unit/gram powder Apply topically 2 times a day. 60 g 3   • Stool Softener-Laxative 8.6-50 mg tablet Take 1 tablet by mouth once daily as needed.     • verapamil ER (Veralan PM) 240 mg 24 hr capsule Take 1 capsule (240 mg) by mouth once daily. 90 capsule 1   • mometasone-formoterol (Dulera) 200-5 mcg/actuation inhaler Inhale 2 puffs twice a day.     • polyethylene glycol (Glycolax) 17 gram/dose powder Take 17 g by mouth once daily. (Patient not taking: Reported on 3/27/2024) 595 g 0   • semaglutide (Ozempic) 2 mg/dose (8 mg/3 mL) pen injector Inject 2 mg  under the skin every 7 days. (Patient not taking: Reported on 3/27/2024) 9 mL 1   • verapamil SR (Calan-SR) 240 mg ER tablet Take 1 tablet (240 mg) by mouth once daily at bedtime. (Patient not taking: Reported on 3/27/2024) 90 tablet 3     No current facility-administered medications on file prior to visit.     No images are attached to the encounter.            Assessment/Plan   Diagnoses and all orders for this visit:  Hypertension, unspecified type    Patient will increase her lisinopril from 10mg to 20mg and we will have her check bp at home if still >140/90 we will continue to adjust the dose of medication  Patient to call if questions or concerns

## 2024-04-17 ENCOUNTER — APPOINTMENT (OUTPATIENT)
Dept: PRIMARY CARE | Facility: CLINIC | Age: 74
End: 2024-04-17
Payer: MEDICARE

## 2024-04-29 ASSESSMENT — ENCOUNTER SYMPTOMS
COUGH: 0
PALPITATIONS: 0
DIZZINESS: 0
CHOKING: 0
ARTHRALGIAS: 0
FATIGUE: 0
CHEST TIGHTNESS: 0
LIGHT-HEADEDNESS: 0
FEVER: 0
FACIAL ASYMMETRY: 0
ACTIVITY CHANGE: 0
BACK PAIN: 0
COLOR CHANGE: 0
APPETITE CHANGE: 0
HEADACHES: 0

## 2024-04-29 NOTE — PROGRESS NOTES
Subjective   Patient ID: Ghazal Mancini is a 74 y.o. female who presents for Follow-up (BP).    HPI   She reports she was over [facility] for Dr. Cardoza for Botox injection and her bp was reading 160/90. She is asymptomatic and not having any chest pain shortness of breath syncopal episodes or palpitations denies any nausea vomit diarrhea constipation.    Review of Systems   Constitutional:  Negative for activity change, appetite change, fatigue and fever.   HENT:  Negative for congestion.    Respiratory:  Negative for cough, choking and chest tightness.    Cardiovascular:  Negative for chest pain, palpitations and leg swelling.   Musculoskeletal:  Negative for arthralgias, back pain and gait problem.   Skin:  Negative for color change and pallor.   Neurological:  Negative for dizziness, facial asymmetry, light-headedness and headaches.       Objective   /68 (BP Location: Left arm)   Pulse 89   Wt 108 kg (238 lb 9.6 oz)   BMI 45.83 kg/m²   BSA Body surface area is 2.15 meters squared.      Physical Exam  Constitutional:       General: She is not in acute distress.     Appearance: Normal appearance. She is not toxic-appearing.   HENT:      Head: Normocephalic.      Right Ear: Tympanic membrane, ear canal and external ear normal.      Left Ear: Tympanic membrane, ear canal and external ear normal.   Eyes:      Conjunctiva/sclera: Conjunctivae normal.      Pupils: Pupils are equal, round, and reactive to light.   Cardiovascular:      Rate and Rhythm: Normal rate and regular rhythm.      Pulses: Normal pulses.      Heart sounds: Normal heart sounds.   Pulmonary:      Effort: No respiratory distress.      Breath sounds: No wheezing, rhonchi or rales.   Musculoskeletal:         General: No swelling or tenderness.   Skin:     Findings: No lesion or rash.   Neurological:      General: No focal deficit present.      Mental Status: She is alert and oriented to person, place, and time. Mental status is at  baseline.      Gait: Gait normal.   Psychiatric:         Mood and Affect: Mood normal.         Behavior: Behavior normal.         Thought Content: Thought content normal.         Judgment: Judgment normal.       Lab on 10/16/2023   Component Date Value Ref Range Status    Albumin, Urine Random 10/16/2023 <7.0  Not established mg/L Final    Creatinine, Urine Random 10/16/2023 40.4  20.0 - 320.0 mg/dL Final    Albumin/Creatine Ratio 10/16/2023    Final    One or more analytes used in this calculation is outside of the analytical measurement range. Calculation cannot be performed.   Lab on 10/13/2023   Component Date Value Ref Range Status    WBC 10/13/2023 7.0  4.4 - 11.3 x10*3/uL Final    nRBC 10/13/2023 0.0  0.0 - 0.0 /100 WBCs Final    RBC 10/13/2023 4.15  4.00 - 5.20 x10*6/uL Final    Hemoglobin 10/13/2023 12.5  12.0 - 16.0 g/dL Final    Hematocrit 10/13/2023 39.6  36.0 - 46.0 % Final    MCV 10/13/2023 95  80 - 100 fL Final    MCH 10/13/2023 30.1  26.0 - 34.0 pg Final    MCHC 10/13/2023 31.6 (L)  32.0 - 36.0 g/dL Final    RDW 10/13/2023 12.9  11.5 - 14.5 % Final    Platelets 10/13/2023 306  150 - 450 x10*3/uL Final    MPV 10/13/2023 9.1  7.5 - 11.5 fL Final    Neutrophils % 10/13/2023 64.2  40.0 - 80.0 % Final    Immature Granulocytes %, Automated 10/13/2023 0.3  0.0 - 0.9 % Final    Immature Granulocyte Count (IG) includes promyelocytes, myelocytes and metamyelocytes but does not include bands. Percent differential counts (%) should be interpreted in the context of the absolute cell counts (cells/UL).    Lymphocytes % 10/13/2023 25.8  13.0 - 44.0 % Final    Monocytes % 10/13/2023 9.2  2.0 - 10.0 % Final    Eosinophils % 10/13/2023 0.1  0.0 - 6.0 % Final    Basophils % 10/13/2023 0.4  0.0 - 2.0 % Final    Neutrophils Absolute 10/13/2023 4.48  1.60 - 5.50 x10*3/uL Final    Percent differential counts (%) should be interpreted in the context of the absolute cell counts (cells/uL).    Immature Granulocytes Absolute,  Au* 10/13/2023 0.02  0.00 - 0.50 x10*3/uL Final    Lymphocytes Absolute 10/13/2023 1.80  0.80 - 3.00 x10*3/uL Final    Monocytes Absolute 10/13/2023 0.64  0.05 - 0.80 x10*3/uL Final    Eosinophils Absolute 10/13/2023 0.01  0.00 - 0.40 x10*3/uL Final    Basophils Absolute 10/13/2023 0.03  0.00 - 0.10 x10*3/uL Final    Glucose 10/13/2023 106 (H)  74 - 99 mg/dL Final    Sodium 10/13/2023 138  136 - 145 mmol/L Final    Potassium 10/13/2023 4.7  3.5 - 5.3 mmol/L Final    Chloride 10/13/2023 101  98 - 107 mmol/L Final    Bicarbonate 10/13/2023 27  21 - 32 mmol/L Final    Anion Gap 10/13/2023 15  10 - 20 mmol/L Final    Urea Nitrogen 10/13/2023 23  6 - 23 mg/dL Final    Creatinine 10/13/2023 0.91  0.50 - 1.05 mg/dL Final    eGFR 10/13/2023 66  >60 mL/min/1.73m*2 Final    Calculations of estimated GFR are performed using the 2021 CKD-EPI Study Refit equation without the race variable for the IDMS-Traceable creatinine methods.  https://jasn.asnjournals.org/content/early/2021/09/22/ASN.7825192076    Calcium 10/13/2023 10.2  8.6 - 10.6 mg/dL Final    Albumin 10/13/2023 4.5  3.4 - 5.0 g/dL Final    Alkaline Phosphatase 10/13/2023 62  33 - 136 U/L Final    Total Protein 10/13/2023 7.2  6.4 - 8.2 g/dL Final    AST 10/13/2023 19  9 - 39 U/L Final    Bilirubin, Total 10/13/2023 0.4  0.0 - 1.2 mg/dL Final    ALT 10/13/2023 20  7 - 45 U/L Final    Patients treated with Sulfasalazine may generate falsely decreased results for ALT.    Thyroid Stimulating Hormone 10/13/2023 1.35  0.44 - 3.98 mIU/L Final    Cholesterol 10/13/2023 151  0 - 199 mg/dL Final          Age      Desirable   Borderline High   High     0-19 Y     0 - 169       170 - 199     >/= 200    20-24 Y     0 - 189       190 - 224     >/= 225         >24 Y     0 - 199       200 - 239     >/= 240   **All ranges are based on fasting samples. Specific   therapeutic targets will vary based on patient-specific   cardiac risk.    Pediatric guidelines reference:Pediatrics 2011,  128(S5).Adult guidelines reference: NCEP ATPIII Guidelines,PALOMO 2001, 258:2486-97    Venipuncture immediately after or during the administration of Metamizole may lead to falsely low results. Testing should be performed immediately prior to Metamizole dosing.    HDL-Cholesterol 10/13/2023 40.1  mg/dL Final      Age       Very Low   Low     Normal    High    0-19 Y    < 35      < 40     40-45     ----  20-24 Y    ----     < 40      >45      ----        >24 Y      ----     < 40     40-60      >60      Cholesterol/HDL Ratio 10/13/2023 3.8   Final      Ref Values  Desirable  < 3.4  High Risk  > 5.0    LDL Calculated 10/13/2023 73  <=99 mg/dL Final                                Near   Borderline      AGE      Desirable  Optimal    High     High     Very High     0-19 Y     0 - 109     ---    110-129   >/= 130     ----    20-24 Y     0 - 119     ---    120-159   >/= 160     ----      >24 Y     0 -  99   100-129  130-159   160-189     >/=190      VLDL 10/13/2023 38  0 - 40 mg/dL Final    Triglycerides 10/13/2023 188 (H)  0 - 149 mg/dL Final       Age         Desirable   Borderline High   High     Very High   0 D-90 D    19 - 174         ----         ----        ----  91 D- 9 Y     0 -  74        75 -  99     >/= 100      ----    10-19 Y     0 -  89        90 - 129     >/= 130      ----    20-24 Y     0 - 114       115 - 149     >/= 150      ----         >24 Y     0 - 149       150 - 199    200- 499    >/= 500    Venipuncture immediately after or during the administration of Metamizole may lead to falsely low results. Testing should be performed immediately prior to Metamizole dosing.    Non HDL Cholesterol 10/13/2023 111  0 - 149 mg/dL Final          Age       Desirable   Borderline High   High     Very High     0-19 Y     0 - 119       120 - 144     >/= 145    >/= 160    20-24 Y     0 - 149       150 - 189     >/= 190      ----         >24 Y    30 mg/dL above LDL Cholesterol goal      Vitamin D, 25-Hydroxy, Total  10/13/2023 53  30 - 100 ng/mL Final    Hemoglobin A1C 10/13/2023 5.7 (H)  see below % Final    Estimated Average Glucose 10/13/2023 117  Not Established mg/dL Final     Current Outpatient Medications on File Prior to Visit   Medication Sig Dispense Refill    acetaminophen (Tylenol) 500 mg tablet Take 650 mg by mouth every 8 hours if needed.      albuterol 90 mcg/actuation inhaler INHALE 1 TO 2 PUFFS EVERY 4 TO 6 HOURS AS NEEDED.      atorvastatin (Lipitor) 20 mg tablet Take 1 tablet (20 mg) by mouth once daily at bedtime. 90 tablet 3    buPROPion XL (Wellbutrin XL) 150 mg 24 hr tablet Take 1 tablet (150 mg) by mouth once daily in the morning. 90 tablet 1    cholecalciferol (Vitamin D-3) 5,000 Units tablet Take 1 tablet (5,000 Units) by mouth once daily.      citalopram (CeleXA) 20 mg tablet Take 1 tablet (20 mg) by mouth once daily. 90 tablet 3    fluticasone furoate-vilanteroL (Breo Ellipta) 200-25 mcg/dose inhaler Inhale 1 puff once daily.      gabapentin (Neurontin) 300 mg capsule Take 1 capsule (300 mg) by mouth once daily. 90 capsule 3    hydroCHLOROthiazide (HYDRODiuril) 25 mg tablet Take 1 tablet (25 mg) by mouth once daily. 90 tablet 3    HYDROcodone-acetaminophen (Norco) 5-325 mg tablet Take 1 tablet by mouth. Two to three times daily      lisinopril 20 mg tablet Take 1 tablet (20 mg) by mouth once daily. 30 tablet 11    meloxicam (Mobic) 15 mg tablet Take 1 tablet (15 mg) by mouth once daily. 90 tablet 0    miscellaneous medical supply (Blood Pressure Cuff) misc Check blood pressure first thing in the morning. 1 each 0    mometasone-formoterol (Dulera) 200-5 mcg/actuation inhaler Inhale 2 puffs twice a day.      nystatin (Mycostatin) 100,000 unit/gram powder Apply topically 2 times a day. 60 g 3    polyethylene glycol (Glycolax) 17 gram/dose powder Take 17 g by mouth once daily. 595 g 0    Stool Softener-Laxative 8.6-50 mg tablet Take 1 tablet by mouth once daily as needed.      verapamil ER (Veralan PM)  240 mg 24 hr capsule Take 1 capsule (240 mg) by mouth once daily. 90 capsule 1     No current facility-administered medications on file prior to visit.     No images are attached to the encounter.            Assessment/Plan   Diagnoses and all orders for this visit:  Hypertension, unspecified type  -     amLODIPine (Norvasc) 2.5 mg tablet; Take 1 tablet (2.5 mg) by mouth once daily.      Patient will increase her lisinopril from 10mg to 20mg and we will have her check bp at home if still >140/90 we will continue to adjust the dose of medication  Patient to call if questions or concerns

## 2024-04-30 ENCOUNTER — OFFICE VISIT (OUTPATIENT)
Dept: PRIMARY CARE | Facility: CLINIC | Age: 74
End: 2024-04-30
Payer: MEDICARE

## 2024-04-30 VITALS
WEIGHT: 238.6 LBS | SYSTOLIC BLOOD PRESSURE: 154 MMHG | BODY MASS INDEX: 45.83 KG/M2 | DIASTOLIC BLOOD PRESSURE: 68 MMHG | HEART RATE: 89 BPM

## 2024-04-30 DIAGNOSIS — I10 HYPERTENSION, UNSPECIFIED TYPE: Primary | ICD-10-CM

## 2024-04-30 PROCEDURE — 3078F DIAST BP <80 MM HG: CPT | Performed by: FAMILY MEDICINE

## 2024-04-30 PROCEDURE — 1159F MED LIST DOCD IN RCRD: CPT | Performed by: FAMILY MEDICINE

## 2024-04-30 PROCEDURE — 1157F ADVNC CARE PLAN IN RCRD: CPT | Performed by: FAMILY MEDICINE

## 2024-04-30 PROCEDURE — 1036F TOBACCO NON-USER: CPT | Performed by: FAMILY MEDICINE

## 2024-04-30 PROCEDURE — 1160F RVW MEDS BY RX/DR IN RCRD: CPT | Performed by: FAMILY MEDICINE

## 2024-04-30 PROCEDURE — 99214 OFFICE O/P EST MOD 30 MIN: CPT | Performed by: FAMILY MEDICINE

## 2024-04-30 PROCEDURE — 3077F SYST BP >= 140 MM HG: CPT | Performed by: FAMILY MEDICINE

## 2024-04-30 RX ORDER — ACETAMINOPHEN 500 MG
5000 TABLET ORAL DAILY
COMMUNITY
Start: 2015-04-01

## 2024-04-30 RX ORDER — AMLODIPINE BESYLATE 2.5 MG/1
2.5 TABLET ORAL DAILY
Qty: 30 TABLET | Refills: 11 | Status: SHIPPED | OUTPATIENT
Start: 2024-04-30 | End: 2025-04-30

## 2024-04-30 RX ORDER — FLUTICASONE FUROATE AND VILANTEROL 200; 25 UG/1; UG/1
1 POWDER RESPIRATORY (INHALATION) DAILY
COMMUNITY
Start: 2019-04-16

## 2024-05-28 ENCOUNTER — OFFICE VISIT (OUTPATIENT)
Dept: PRIMARY CARE | Facility: CLINIC | Age: 74
End: 2024-05-28
Payer: MEDICARE

## 2024-05-28 VITALS
DIASTOLIC BLOOD PRESSURE: 62 MMHG | HEART RATE: 89 BPM | BODY MASS INDEX: 44.64 KG/M2 | SYSTOLIC BLOOD PRESSURE: 142 MMHG | WEIGHT: 232.4 LBS

## 2024-05-28 DIAGNOSIS — I10 HYPERTENSION, UNSPECIFIED TYPE: Primary | ICD-10-CM

## 2024-05-28 PROCEDURE — 1160F RVW MEDS BY RX/DR IN RCRD: CPT | Performed by: FAMILY MEDICINE

## 2024-05-28 PROCEDURE — 3077F SYST BP >= 140 MM HG: CPT | Performed by: FAMILY MEDICINE

## 2024-05-28 PROCEDURE — 3078F DIAST BP <80 MM HG: CPT | Performed by: FAMILY MEDICINE

## 2024-05-28 PROCEDURE — 1157F ADVNC CARE PLAN IN RCRD: CPT | Performed by: FAMILY MEDICINE

## 2024-05-28 PROCEDURE — 99214 OFFICE O/P EST MOD 30 MIN: CPT | Performed by: FAMILY MEDICINE

## 2024-05-28 PROCEDURE — 1159F MED LIST DOCD IN RCRD: CPT | Performed by: FAMILY MEDICINE

## 2024-05-28 ASSESSMENT — ENCOUNTER SYMPTOMS
CHOKING: 0
LIGHT-HEADEDNESS: 0
COUGH: 0
DIZZINESS: 0
PALPITATIONS: 0
FACIAL ASYMMETRY: 0
APPETITE CHANGE: 0
CHEST TIGHTNESS: 0
FEVER: 0
BACK PAIN: 0
ACTIVITY CHANGE: 0
ARTHRALGIAS: 0
HEADACHES: 0
FATIGUE: 0
COLOR CHANGE: 0

## 2024-05-28 NOTE — PROGRESS NOTES
Subjective   Patient ID: Ghazal Mancini is a 74 y.o. female who presents for Follow-up (BP /Home device: 141/67).    HPI   Patient's blood pressures have been running in the 140s over 70s at home.  She denies any nausea vomiting diarrhea constipation denies any headaches chest pain syncopal episodes.    Review of Systems   Constitutional:  Negative for activity change, appetite change, fatigue and fever.   HENT:  Negative for congestion.    Respiratory:  Negative for cough, choking and chest tightness.    Cardiovascular:  Negative for chest pain, palpitations and leg swelling.   Musculoskeletal:  Negative for arthralgias, back pain and gait problem.   Skin:  Negative for color change and pallor.   Neurological:  Negative for dizziness, facial asymmetry, light-headedness and headaches.       Objective   /62 (BP Location: Right arm)   Pulse 89   Wt 105 kg (232 lb 6.4 oz)   BMI 44.64 kg/m²   BSA Body surface area is 2.12 meters squared.      Physical Exam  Constitutional:       General: She is not in acute distress.     Appearance: Normal appearance. She is not toxic-appearing.   HENT:      Head: Normocephalic.      Right Ear: Tympanic membrane, ear canal and external ear normal.      Left Ear: Tympanic membrane, ear canal and external ear normal.   Eyes:      Conjunctiva/sclera: Conjunctivae normal.      Pupils: Pupils are equal, round, and reactive to light.   Cardiovascular:      Rate and Rhythm: Normal rate and regular rhythm.      Pulses: Normal pulses.      Heart sounds: Normal heart sounds.   Pulmonary:      Effort: No respiratory distress.      Breath sounds: No wheezing, rhonchi or rales.   Musculoskeletal:         General: No swelling or tenderness.   Skin:     Findings: No lesion or rash.   Neurological:      General: No focal deficit present.      Mental Status: She is alert and oriented to person, place, and time. Mental status is at baseline.      Gait: Gait normal.   Psychiatric:          Mood and Affect: Mood normal.         Behavior: Behavior normal.         Thought Content: Thought content normal.         Judgment: Judgment normal.       Lab on 10/16/2023   Component Date Value Ref Range Status    Albumin, Urine Random 10/16/2023 <7.0  Not established mg/L Final    Creatinine, Urine Random 10/16/2023 40.4  20.0 - 320.0 mg/dL Final    Albumin/Creatine Ratio 10/16/2023    Final    One or more analytes used in this calculation is outside of the analytical measurement range. Calculation cannot be performed.   Lab on 10/13/2023   Component Date Value Ref Range Status    WBC 10/13/2023 7.0  4.4 - 11.3 x10*3/uL Final    nRBC 10/13/2023 0.0  0.0 - 0.0 /100 WBCs Final    RBC 10/13/2023 4.15  4.00 - 5.20 x10*6/uL Final    Hemoglobin 10/13/2023 12.5  12.0 - 16.0 g/dL Final    Hematocrit 10/13/2023 39.6  36.0 - 46.0 % Final    MCV 10/13/2023 95  80 - 100 fL Final    MCH 10/13/2023 30.1  26.0 - 34.0 pg Final    MCHC 10/13/2023 31.6 (L)  32.0 - 36.0 g/dL Final    RDW 10/13/2023 12.9  11.5 - 14.5 % Final    Platelets 10/13/2023 306  150 - 450 x10*3/uL Final    MPV 10/13/2023 9.1  7.5 - 11.5 fL Final    Neutrophils % 10/13/2023 64.2  40.0 - 80.0 % Final    Immature Granulocytes %, Automated 10/13/2023 0.3  0.0 - 0.9 % Final    Immature Granulocyte Count (IG) includes promyelocytes, myelocytes and metamyelocytes but does not include bands. Percent differential counts (%) should be interpreted in the context of the absolute cell counts (cells/UL).    Lymphocytes % 10/13/2023 25.8  13.0 - 44.0 % Final    Monocytes % 10/13/2023 9.2  2.0 - 10.0 % Final    Eosinophils % 10/13/2023 0.1  0.0 - 6.0 % Final    Basophils % 10/13/2023 0.4  0.0 - 2.0 % Final    Neutrophils Absolute 10/13/2023 4.48  1.60 - 5.50 x10*3/uL Final    Percent differential counts (%) should be interpreted in the context of the absolute cell counts (cells/uL).    Immature Granulocytes Absolute, Au* 10/13/2023 0.02  0.00 - 0.50 x10*3/uL Final     Lymphocytes Absolute 10/13/2023 1.80  0.80 - 3.00 x10*3/uL Final    Monocytes Absolute 10/13/2023 0.64  0.05 - 0.80 x10*3/uL Final    Eosinophils Absolute 10/13/2023 0.01  0.00 - 0.40 x10*3/uL Final    Basophils Absolute 10/13/2023 0.03  0.00 - 0.10 x10*3/uL Final    Glucose 10/13/2023 106 (H)  74 - 99 mg/dL Final    Sodium 10/13/2023 138  136 - 145 mmol/L Final    Potassium 10/13/2023 4.7  3.5 - 5.3 mmol/L Final    Chloride 10/13/2023 101  98 - 107 mmol/L Final    Bicarbonate 10/13/2023 27  21 - 32 mmol/L Final    Anion Gap 10/13/2023 15  10 - 20 mmol/L Final    Urea Nitrogen 10/13/2023 23  6 - 23 mg/dL Final    Creatinine 10/13/2023 0.91  0.50 - 1.05 mg/dL Final    eGFR 10/13/2023 66  >60 mL/min/1.73m*2 Final    Calculations of estimated GFR are performed using the 2021 CKD-EPI Study Refit equation without the race variable for the IDMS-Traceable creatinine methods.  https://jasn.asnjournals.org/content/early/2021/09/22/ASN.6178017308    Calcium 10/13/2023 10.2  8.6 - 10.6 mg/dL Final    Albumin 10/13/2023 4.5  3.4 - 5.0 g/dL Final    Alkaline Phosphatase 10/13/2023 62  33 - 136 U/L Final    Total Protein 10/13/2023 7.2  6.4 - 8.2 g/dL Final    AST 10/13/2023 19  9 - 39 U/L Final    Bilirubin, Total 10/13/2023 0.4  0.0 - 1.2 mg/dL Final    ALT 10/13/2023 20  7 - 45 U/L Final    Patients treated with Sulfasalazine may generate falsely decreased results for ALT.    Thyroid Stimulating Hormone 10/13/2023 1.35  0.44 - 3.98 mIU/L Final    Cholesterol 10/13/2023 151  0 - 199 mg/dL Final          Age      Desirable   Borderline High   High     0-19 Y     0 - 169       170 - 199     >/= 200    20-24 Y     0 - 189       190 - 224     >/= 225         >24 Y     0 - 199       200 - 239     >/= 240   **All ranges are based on fasting samples. Specific   therapeutic targets will vary based on patient-specific   cardiac risk.    Pediatric guidelines reference:Pediatrics 2011, 128(S5).Adult guidelines reference: NCEP ATPIII  Guidelines,PALOMO 2001, 258:2486-97    Venipuncture immediately after or during the administration of Metamizole may lead to falsely low results. Testing should be performed immediately prior to Metamizole dosing.    HDL-Cholesterol 10/13/2023 40.1  mg/dL Final      Age       Very Low   Low     Normal    High    0-19 Y    < 35      < 40     40-45     ----  20-24 Y    ----     < 40      >45      ----        >24 Y      ----     < 40     40-60      >60      Cholesterol/HDL Ratio 10/13/2023 3.8   Final      Ref Values  Desirable  < 3.4  High Risk  > 5.0    LDL Calculated 10/13/2023 73  <=99 mg/dL Final                                Near   Borderline      AGE      Desirable  Optimal    High     High     Very High     0-19 Y     0 - 109     ---    110-129   >/= 130     ----    20-24 Y     0 - 119     ---    120-159   >/= 160     ----      >24 Y     0 -  99   100-129  130-159   160-189     >/=190      VLDL 10/13/2023 38  0 - 40 mg/dL Final    Triglycerides 10/13/2023 188 (H)  0 - 149 mg/dL Final       Age         Desirable   Borderline High   High     Very High   0 D-90 D    19 - 174         ----         ----        ----  91 D- 9 Y     0 -  74        75 -  99     >/= 100      ----    10-19 Y     0 -  89        90 - 129     >/= 130      ----    20-24 Y     0 - 114       115 - 149     >/= 150      ----         >24 Y     0 - 149       150 - 199    200- 499    >/= 500    Venipuncture immediately after or during the administration of Metamizole may lead to falsely low results. Testing should be performed immediately prior to Metamizole dosing.    Non HDL Cholesterol 10/13/2023 111  0 - 149 mg/dL Final          Age       Desirable   Borderline High   High     Very High     0-19 Y     0 - 119       120 - 144     >/= 145    >/= 160    20-24 Y     0 - 149       150 - 189     >/= 190      ----         >24 Y    30 mg/dL above LDL Cholesterol goal      Vitamin D, 25-Hydroxy, Total 10/13/2023 53  30 - 100 ng/mL Final    Hemoglobin A1C  10/13/2023 5.7 (H)  see below % Final    Estimated Average Glucose 10/13/2023 117  Not Established mg/dL Final     Current Outpatient Medications on File Prior to Visit   Medication Sig Dispense Refill    acetaminophen (Tylenol) 500 mg tablet Take 650 mg by mouth every 8 hours if needed.      albuterol 90 mcg/actuation inhaler INHALE 1 TO 2 PUFFS EVERY 4 TO 6 HOURS AS NEEDED.      amLODIPine (Norvasc) 2.5 mg tablet Take 1 tablet (2.5 mg) by mouth once daily. 30 tablet 11    atorvastatin (Lipitor) 20 mg tablet Take 1 tablet (20 mg) by mouth once daily at bedtime. 90 tablet 3    buPROPion XL (Wellbutrin XL) 150 mg 24 hr tablet Take 1 tablet (150 mg) by mouth once daily in the morning. 90 tablet 1    cholecalciferol (Vitamin D-3) 5,000 Units tablet Take 1 tablet (5,000 Units) by mouth once daily.      citalopram (CeleXA) 20 mg tablet Take 1 tablet (20 mg) by mouth once daily. 90 tablet 3    fluticasone furoate-vilanteroL (Breo Ellipta) 200-25 mcg/dose inhaler Inhale 1 puff once daily.      gabapentin (Neurontin) 300 mg capsule Take 1 capsule (300 mg) by mouth once daily. 90 capsule 3    hydroCHLOROthiazide (HYDRODiuril) 25 mg tablet Take 1 tablet (25 mg) by mouth once daily. 90 tablet 3    HYDROcodone-acetaminophen (Norco) 5-325 mg tablet Take 1 tablet by mouth. Two to three times daily      lisinopril 20 mg tablet Take 1 tablet (20 mg) by mouth once daily. 30 tablet 11    meloxicam (Mobic) 15 mg tablet Take 1 tablet (15 mg) by mouth once daily. 90 tablet 0    miscellaneous medical supply (Blood Pressure Cuff) misc Check blood pressure first thing in the morning. 1 each 0    mometasone-formoterol (Dulera) 200-5 mcg/actuation inhaler Inhale 2 puffs twice a day.      nystatin (Mycostatin) 100,000 unit/gram powder Apply topically 2 times a day. 60 g 3    polyethylene glycol (Glycolax) 17 gram/dose powder Take 17 g by mouth once daily. 595 g 0    Stool Softener-Laxative 8.6-50 mg tablet Take 1 tablet by mouth once daily as  needed.      verapamil ER (Veralan PM) 240 mg 24 hr capsule Take 1 capsule (240 mg) by mouth once daily. 90 capsule 1     No current facility-administered medications on file prior to visit.     No images are attached to the encounter.            Assessment/Plan   Diagnoses and all orders for this visit:  Hypertension, unspecified type    Patient to continue on increased dose of lisinopril medication  Patient to call if questions or concerns

## 2024-07-02 ENCOUNTER — TELEPHONE (OUTPATIENT)
Dept: PRIMARY CARE | Facility: CLINIC | Age: 74
End: 2024-07-02
Payer: MEDICARE

## 2024-07-02 DIAGNOSIS — I10 HYPERTENSION, UNSPECIFIED TYPE: ICD-10-CM

## 2024-07-02 RX ORDER — LISINOPRIL 40 MG/1
40 TABLET ORAL DAILY
Qty: 90 TABLET | Refills: 1 | Status: SHIPPED | OUTPATIENT
Start: 2024-07-02

## 2024-07-02 NOTE — TELEPHONE ENCOUNTER
Pt requesting refill and needs clarification on dosage.      Pt states she takes two (2) 20 mg tablets a day.  Please confirm since I show 1qd and let pt know.    Lisinopril  20 mg  1qd   #??  Refill: ?    [facility] mail order # [phone]

## 2024-07-09 DIAGNOSIS — I10 HYPERTENSION, UNSPECIFIED TYPE: ICD-10-CM

## 2024-07-09 RX ORDER — AMLODIPINE BESYLATE 2.5 MG/1
2.5 TABLET ORAL DAILY
Qty: 90 TABLET | Refills: 3 | Status: SHIPPED | OUTPATIENT
Start: 2024-07-09

## 2024-07-09 RX ORDER — HYDROCHLOROTHIAZIDE 25 MG/1
25 TABLET ORAL DAILY
Qty: 90 TABLET | Refills: 3 | Status: SHIPPED | OUTPATIENT
Start: 2024-07-09

## 2024-07-22 DIAGNOSIS — F41.1 GENERALIZED ANXIETY DISORDER: ICD-10-CM

## 2024-07-22 RX ORDER — BUPROPION HYDROCHLORIDE 150 MG/1
150 TABLET ORAL
Qty: 90 TABLET | Refills: 1 | Status: SHIPPED | OUTPATIENT
Start: 2024-07-22

## 2024-08-15 ENCOUNTER — TELEPHONE (OUTPATIENT)
Dept: PRIMARY CARE | Facility: CLINIC | Age: 74
End: 2024-08-15
Payer: MEDICARE

## 2024-08-15 DIAGNOSIS — I10 HYPERTENSION, UNSPECIFIED TYPE: ICD-10-CM

## 2024-08-15 RX ORDER — VERAPAMIL HYDROCHLORIDE 240 MG/1
240 CAPSULE, EXTENDED RELEASE ORAL DAILY
Qty: 30 CAPSULE | Refills: 0 | Status: SHIPPED | OUTPATIENT
Start: 2024-08-15 | End: 2024-08-15 | Stop reason: SDUPTHER

## 2024-08-15 NOTE — TELEPHONE ENCOUNTER
Pt is down to 1 pill and needs a temporary supply sent to local pharmacy and the rest of the refill sent to mail order pharmacy please     verapamil ER (Veralan PM) 240 mg 24 hr capsule       [facility] [address] [place] [address], [id number] (temporary supply)       [facility] - [place], [place] - [address] [[id number]]

## 2024-08-16 RX ORDER — VERAPAMIL HYDROCHLORIDE 240 MG/1
240 CAPSULE, EXTENDED RELEASE ORAL DAILY
Qty: 90 CAPSULE | Refills: 1 | Status: SHIPPED | OUTPATIENT
Start: 2024-08-16 | End: 2024-08-17 | Stop reason: SDUPTHER

## 2024-08-17 ENCOUNTER — TELEPHONE (OUTPATIENT)
Dept: PRIMARY CARE | Facility: CLINIC | Age: 74
End: 2024-08-17
Payer: MEDICARE

## 2024-08-17 DIAGNOSIS — I10 HYPERTENSION, UNSPECIFIED TYPE: ICD-10-CM

## 2024-08-17 RX ORDER — VERAPAMIL HYDROCHLORIDE 240 MG/1
240 CAPSULE, EXTENDED RELEASE ORAL DAILY
Qty: 30 CAPSULE | Refills: 0 | Status: SHIPPED | OUTPATIENT
Start: 2024-08-17

## 2024-08-17 RX ORDER — VERAPAMIL HYDROCHLORIDE 240 MG/1
240 CAPSULE, EXTENDED RELEASE ORAL DAILY
Qty: 30 CAPSULE | Refills: 0 | Status: CANCELLED | OUTPATIENT
Start: 2024-08-17

## 2024-08-17 RX ORDER — VERAPAMIL HYDROCHLORIDE 240 MG/1
240 CAPSULE, EXTENDED RELEASE ORAL DAILY
Qty: 30 CAPSULE | Refills: 0 | Status: SHIPPED | OUTPATIENT
Start: 2024-08-17 | End: 2024-08-17 | Stop reason: SDUPTHER

## 2024-08-17 NOTE — TELEPHONE ENCOUNTER
Pt called about refill she needs a short supply of   verapamil ER (Veralan PM) 240 mg 24 hr capsule, it was sent to mail order pharmacy and she will be out before she gets the medication.   [facility]/pharmacy #[id number] [facility], [place] - [address]   Phone: [phone]

## 2024-08-23 ENCOUNTER — TELEPHONE (OUTPATIENT)
Dept: PRIMARY CARE | Facility: CLINIC | Age: 74
End: 2024-08-23
Payer: MEDICARE

## 2024-08-23 DIAGNOSIS — M54.16 LUMBAR RADICULOPATHY: ICD-10-CM

## 2024-08-23 DIAGNOSIS — F41.1 GENERALIZED ANXIETY DISORDER: ICD-10-CM

## 2024-08-23 RX ORDER — BUPROPION HYDROCHLORIDE 150 MG/1
150 TABLET ORAL
Qty: 90 TABLET | Refills: 1 | Status: SHIPPED | OUTPATIENT
Start: 2024-08-23

## 2024-08-23 RX ORDER — MELOXICAM 15 MG/1
15 TABLET ORAL DAILY
Qty: 90 TABLET | Refills: 0 | Status: SHIPPED | OUTPATIENT
Start: 2024-08-23

## 2024-08-23 NOTE — TELEPHONE ENCOUNTER
Fax request from [facility] requesting a refill on:     meloxicam (Mobic) 15 mg tablet    Take 1 tablet (15 mg) by mouth once daily.   Quantity: 90 tablet     buPROPion XL (Wellbutrin XL) 150 mg 24 hr tablet   TAKE ONE TABLET BY MOUTH EVERY MORNING   Quantity: 90 tablet     [facility], [facility] - [place], [place] - [address] [address]  Phone: [phone]  Fax: [phone]

## 2024-10-13 DIAGNOSIS — M54.16 LUMBAR RADICULOPATHY: ICD-10-CM

## 2024-10-14 RX ORDER — MELOXICAM 15 MG/1
15 TABLET ORAL DAILY
Qty: 90 TABLET | Refills: 0 | Status: SHIPPED | OUTPATIENT
Start: 2024-10-14

## 2024-10-15 ENCOUNTER — TELEPHONE (OUTPATIENT)
Dept: PRIMARY CARE | Facility: CLINIC | Age: 74
End: 2024-10-15
Payer: MEDICARE

## 2024-10-15 NOTE — TELEPHONE ENCOUNTER
[facility] requesting a refill on:    gabapentin (Neurontin) 300 mg capsule   Take 1 capsule (300 mg) by mouth once daily   Quantity: 90 capsule     [facility], [facility]. - [place], [place] - [address] [address]  Phone: [phone]  Fax: [phone]

## 2024-10-16 ENCOUNTER — LAB (OUTPATIENT)
Dept: LAB | Facility: LAB | Age: 74
End: 2024-10-16
Payer: COMMERCIAL

## 2024-10-16 DIAGNOSIS — M54.16 LUMBAR RADICULOPATHY: ICD-10-CM

## 2024-10-16 DIAGNOSIS — R73.01 ELEVATED FASTING GLUCOSE: ICD-10-CM

## 2024-10-16 DIAGNOSIS — I10 PRIMARY HYPERTENSION: ICD-10-CM

## 2024-10-16 DIAGNOSIS — E55.9 VITAMIN D DEFICIENCY: ICD-10-CM

## 2024-10-16 DIAGNOSIS — E78.01 FAMILIAL HYPERCHOLESTEROLEMIA: ICD-10-CM

## 2024-10-16 LAB
25(OH)D3 SERPL-MCNC: 64 NG/ML (ref 30–100)
ALBUMIN SERPL BCP-MCNC: 4.5 G/DL (ref 3.4–5)
ALP SERPL-CCNC: 71 U/L (ref 33–136)
ALT SERPL W P-5'-P-CCNC: 12 U/L (ref 7–45)
ANION GAP SERPL CALC-SCNC: 15 MMOL/L (ref 10–20)
AST SERPL W P-5'-P-CCNC: 17 U/L (ref 9–39)
BASOPHILS # BLD AUTO: 0.06 X10*3/UL (ref 0–0.1)
BASOPHILS NFR BLD AUTO: 1 %
BILIRUB SERPL-MCNC: 0.4 MG/DL (ref 0–1.2)
BUN SERPL-MCNC: 26 MG/DL (ref 6–23)
CALCIUM SERPL-MCNC: 9.6 MG/DL (ref 8.6–10.6)
CHLORIDE SERPL-SCNC: 100 MMOL/L (ref 98–107)
CHOLEST SERPL-MCNC: 131 MG/DL (ref 0–199)
CHOLESTEROL/HDL RATIO: 3.1
CO2 SERPL-SCNC: 27 MMOL/L (ref 21–32)
CREAT SERPL-MCNC: 1.04 MG/DL (ref 0.5–1.05)
EGFRCR SERPLBLD CKD-EPI 2021: 56 ML/MIN/1.73M*2
EOSINOPHIL # BLD AUTO: 0.19 X10*3/UL (ref 0–0.4)
EOSINOPHIL NFR BLD AUTO: 3.2 %
ERYTHROCYTE [DISTWIDTH] IN BLOOD BY AUTOMATED COUNT: 12.8 % (ref 11.5–14.5)
EST. AVERAGE GLUCOSE BLD GHB EST-MCNC: 117 MG/DL
GLUCOSE SERPL-MCNC: 96 MG/DL (ref 74–99)
HBA1C MFR BLD: 5.7 %
HCT VFR BLD AUTO: 36 % (ref 36–46)
HDLC SERPL-MCNC: 42.5 MG/DL
HGB BLD-MCNC: 12.1 G/DL (ref 12–16)
IMM GRANULOCYTES # BLD AUTO: 0.01 X10*3/UL (ref 0–0.5)
IMM GRANULOCYTES NFR BLD AUTO: 0.2 % (ref 0–0.9)
LDLC SERPL CALC-MCNC: 60 MG/DL
LYMPHOCYTES # BLD AUTO: 1.81 X10*3/UL (ref 0.8–3)
LYMPHOCYTES NFR BLD AUTO: 30.6 %
MCH RBC QN AUTO: 30.9 PG (ref 26–34)
MCHC RBC AUTO-ENTMCNC: 33.6 G/DL (ref 32–36)
MCV RBC AUTO: 92 FL (ref 80–100)
MONOCYTES # BLD AUTO: 0.57 X10*3/UL (ref 0.05–0.8)
MONOCYTES NFR BLD AUTO: 9.6 %
NEUTROPHILS # BLD AUTO: 3.28 X10*3/UL (ref 1.6–5.5)
NEUTROPHILS NFR BLD AUTO: 55.4 %
NON HDL CHOLESTEROL: 89 MG/DL (ref 0–149)
NRBC BLD-RTO: 0 /100 WBCS (ref 0–0)
PLATELET # BLD AUTO: 330 X10*3/UL (ref 150–450)
POTASSIUM SERPL-SCNC: 4.7 MMOL/L (ref 3.5–5.3)
PROT SERPL-MCNC: 7 G/DL (ref 6.4–8.2)
RBC # BLD AUTO: 3.91 X10*6/UL (ref 4–5.2)
SODIUM SERPL-SCNC: 137 MMOL/L (ref 136–145)
TRIGL SERPL-MCNC: 145 MG/DL (ref 0–149)
TSH SERPL-ACNC: 1.49 MIU/L (ref 0.44–3.98)
VLDL: 29 MG/DL (ref 0–40)
WBC # BLD AUTO: 5.9 X10*3/UL (ref 4.4–11.3)

## 2024-10-16 PROCEDURE — 36415 COLL VENOUS BLD VENIPUNCTURE: CPT

## 2024-10-16 PROCEDURE — 80061 LIPID PANEL: CPT

## 2024-10-16 PROCEDURE — 80053 COMPREHEN METABOLIC PANEL: CPT

## 2024-10-16 PROCEDURE — 85025 COMPLETE CBC W/AUTO DIFF WBC: CPT

## 2024-10-16 PROCEDURE — 82306 VITAMIN D 25 HYDROXY: CPT

## 2024-10-16 PROCEDURE — 84443 ASSAY THYROID STIM HORMONE: CPT

## 2024-10-16 PROCEDURE — 83036 HEMOGLOBIN GLYCOSYLATED A1C: CPT

## 2024-10-16 RX ORDER — GABAPENTIN 300 MG/1
300 CAPSULE ORAL DAILY
Qty: 90 CAPSULE | Refills: 1 | Status: SHIPPED | OUTPATIENT
Start: 2024-10-16

## 2024-10-18 ENCOUNTER — APPOINTMENT (OUTPATIENT)
Dept: PRIMARY CARE | Facility: CLINIC | Age: 74
End: 2024-10-18
Payer: MEDICARE

## 2024-10-18 VITALS
DIASTOLIC BLOOD PRESSURE: 56 MMHG | SYSTOLIC BLOOD PRESSURE: 128 MMHG | HEIGHT: 60 IN | BODY MASS INDEX: 44.65 KG/M2 | WEIGHT: 227.4 LBS | HEART RATE: 76 BPM

## 2024-10-18 DIAGNOSIS — E78.01 FAMILIAL HYPERCHOLESTEROLEMIA: ICD-10-CM

## 2024-10-18 DIAGNOSIS — Z71.89 ADVANCE DIRECTIVE DISCUSSED WITH PATIENT: ICD-10-CM

## 2024-10-18 DIAGNOSIS — R01.1 HEART MURMUR: ICD-10-CM

## 2024-10-18 DIAGNOSIS — Z00.00 ROUTINE GENERAL MEDICAL EXAMINATION AT HEALTH CARE FACILITY: ICD-10-CM

## 2024-10-18 DIAGNOSIS — J43.2 CENTRILOBULAR EMPHYSEMA (MULTI): ICD-10-CM

## 2024-10-18 DIAGNOSIS — Z78.0 ASYMPTOMATIC MENOPAUSAL STATE: ICD-10-CM

## 2024-10-18 DIAGNOSIS — Z12.31 ENCOUNTER FOR SCREENING MAMMOGRAM FOR BREAST CANCER: ICD-10-CM

## 2024-10-18 DIAGNOSIS — I35.0 AORTIC VALVE STENOSIS, ETIOLOGY OF CARDIAC VALVE DISEASE UNSPECIFIED: ICD-10-CM

## 2024-10-18 DIAGNOSIS — M19.90 OSTEOARTHRITIS, UNSPECIFIED OSTEOARTHRITIS TYPE, UNSPECIFIED SITE: ICD-10-CM

## 2024-10-18 DIAGNOSIS — R73.03 PREDIABETES: ICD-10-CM

## 2024-10-18 DIAGNOSIS — I10 ESSENTIAL HYPERTENSION: ICD-10-CM

## 2024-10-18 DIAGNOSIS — N18.31 CHRONIC KIDNEY DISEASE, STAGE 3A (MULTI): ICD-10-CM

## 2024-10-18 DIAGNOSIS — J45.40 MODERATE PERSISTENT ASTHMA, UNSPECIFIED WHETHER COMPLICATED (HHS-HCC): ICD-10-CM

## 2024-10-18 DIAGNOSIS — M54.50 LOW BACK PAIN, UNSPECIFIED BACK PAIN LATERALITY, UNSPECIFIED CHRONICITY, UNSPECIFIED WHETHER SCIATICA PRESENT: ICD-10-CM

## 2024-10-18 DIAGNOSIS — Z71.89 CARDIAC RISK COUNSELING: ICD-10-CM

## 2024-10-18 DIAGNOSIS — E55.9 VITAMIN D DEFICIENCY: ICD-10-CM

## 2024-10-18 DIAGNOSIS — F43.23 ADJUSTMENT DISORDER WITH MIXED ANXIETY AND DEPRESSED MOOD: ICD-10-CM

## 2024-10-18 DIAGNOSIS — Z00.00 HEALTHCARE MAINTENANCE: Primary | ICD-10-CM

## 2024-10-18 PROBLEM — R06.02 SHORTNESS OF BREATH: Status: RESOLVED | Noted: 2023-04-07 | Resolved: 2024-10-18

## 2024-10-18 PROBLEM — M47.816 SPONDYLOSIS OF LUMBAR SPINE: Status: ACTIVE | Noted: 2019-05-13

## 2024-10-18 PROBLEM — E28.39 DECREASED ESTROGEN LEVEL: Status: RESOLVED | Noted: 2024-10-18 | Resolved: 2024-10-18

## 2024-10-18 PROBLEM — M79.10 MUSCLE PAIN: Status: RESOLVED | Noted: 2023-05-26 | Resolved: 2024-10-18

## 2024-10-18 PROBLEM — R30.0 DYSURIA: Status: RESOLVED | Noted: 2023-04-07 | Resolved: 2024-10-18

## 2024-10-18 PROBLEM — M25.559 ARTHRALGIA OF HIP: Status: ACTIVE | Noted: 2023-04-07

## 2024-10-18 PROBLEM — R73.9 HYPERGLYCEMIA: Status: ACTIVE | Noted: 2023-04-07

## 2024-10-18 PROCEDURE — G0446 INTENS BEHAVE THER CARDIO DX: HCPCS | Performed by: FAMILY MEDICINE

## 2024-10-18 PROCEDURE — 1158F ADVNC CARE PLAN TLK DOCD: CPT | Performed by: FAMILY MEDICINE

## 2024-10-18 PROCEDURE — 1160F RVW MEDS BY RX/DR IN RCRD: CPT | Performed by: FAMILY MEDICINE

## 2024-10-18 PROCEDURE — 1157F ADVNC CARE PLAN IN RCRD: CPT | Performed by: FAMILY MEDICINE

## 2024-10-18 PROCEDURE — 99497 ADVNCD CARE PLAN 30 MIN: CPT | Performed by: FAMILY MEDICINE

## 2024-10-18 PROCEDURE — 1159F MED LIST DOCD IN RCRD: CPT | Performed by: FAMILY MEDICINE

## 2024-10-18 PROCEDURE — 3078F DIAST BP <80 MM HG: CPT | Performed by: FAMILY MEDICINE

## 2024-10-18 PROCEDURE — 1123F ACP DISCUSS/DSCN MKR DOCD: CPT | Performed by: FAMILY MEDICINE

## 2024-10-18 PROCEDURE — 3074F SYST BP LT 130 MM HG: CPT | Performed by: FAMILY MEDICINE

## 2024-10-18 PROCEDURE — 1170F FXNL STATUS ASSESSED: CPT | Performed by: FAMILY MEDICINE

## 2024-10-18 PROCEDURE — 1036F TOBACCO NON-USER: CPT | Performed by: FAMILY MEDICINE

## 2024-10-18 ASSESSMENT — ENCOUNTER SYMPTOMS
LIGHT-HEADEDNESS: 0
FATIGUE: 0
PALPITATIONS: 0
BACK PAIN: 0
FEVER: 0
DEPRESSION: 0
HEADACHES: 0
DIZZINESS: 0
FACIAL ASYMMETRY: 0
ARTHRALGIAS: 0
CHOKING: 0
COUGH: 0
COLOR CHANGE: 0
APPETITE CHANGE: 0
LOSS OF SENSATION IN FEET: 0
CHEST TIGHTNESS: 0
ACTIVITY CHANGE: 0
OCCASIONAL FEELINGS OF UNSTEADINESS: 0

## 2024-10-18 ASSESSMENT — ACTIVITIES OF DAILY LIVING (ADL)
BATHING: INDEPENDENT
DOING_HOUSEWORK: INDEPENDENT
TAKING_MEDICATION: INDEPENDENT
DRESSING: INDEPENDENT
MANAGING_FINANCES: INDEPENDENT
GROCERY_SHOPPING: INDEPENDENT

## 2024-10-18 ASSESSMENT — PATIENT HEALTH QUESTIONNAIRE - PHQ9
2. FEELING DOWN, DEPRESSED OR HOPELESS: NOT AT ALL
10. IF YOU CHECKED OFF ANY PROBLEMS, HOW DIFFICULT HAVE THESE PROBLEMS MADE IT FOR YOU TO DO YOUR WORK, TAKE CARE OF THINGS AT HOME, OR GET ALONG WITH OTHER PEOPLE: NOT DIFFICULT AT ALL
SUM OF ALL RESPONSES TO PHQ9 QUESTIONS 1 AND 2: 0
1. LITTLE INTEREST OR PLEASURE IN DOING THINGS: NOT AT ALL

## 2024-10-18 NOTE — PROGRESS NOTES
Subjective   Reason for Visit: Ghazal Mancini is an 74 y.o. female here for a Medicare Wellness visit.     Past Medical, Surgical, and Family History reviewed and updated in chart.    Reviewed all medications by prescribing practitioner or clinical pharmacist (such as prescriptions, OTCs, herbal therapies and supplements) and documented in the medical record.    HPI  Patient presents for physical exam.      Fam Hx  Mom (65) , DMII, Rheumatic fever, valvular disease  Dad (60) , CHF, MI, HTN,      Exercise walks, mows  ETOH 2 drinks/year  Caffeine 2 cups coffee/ 2 soda  Tobacco 1 ppd x 40 years     Mammogram due   DEXA  osteopenia, due   Colonoscopy Dr. Bah  due      Patient denies other complaints at this office visit.   Patient Self Assessment of Health Status  Patient Self Assessment: Good    Nutrition and Exercise  Current Diet: Unhealthy Diet  Adequate Fluid Intake: No  Caffeine: Yes  Exercise Frequency: No Exercise    Functional Ability/Level of Safety  Cognitive Impairment Observed: No cognitive impairment observed  Cognitive Impairment Reported: No cognitive impairment reported by patient or family    Home Safety Risk Factors: None    Patient Care Team:  Becka Foreman DO as PCP - General  Becka Foreman DO as PCP - [facility] Attributed Provider     Review of Systems   Constitutional:  Negative for activity change, appetite change, fatigue and fever.   HENT:  Negative for congestion.    Respiratory:  Negative for cough, choking and chest tightness.    Cardiovascular:  Negative for chest pain, palpitations and leg swelling.   Musculoskeletal:  Negative for arthralgias, back pain and gait problem.   Skin:  Negative for color change and pallor.   Neurological:  Negative for dizziness, facial asymmetry, light-headedness and headaches.       Objective   Vitals:  /56 (BP Location: Left arm, Patient Position: Sitting)   Pulse 76   Ht 1.524 m (5')   Wt 103 kg (227 lb  6.4 oz)   BMI 44.41 kg/m²       Physical Exam  Constitutional:       General: She is not in acute distress.     Appearance: Normal appearance. She is not toxic-appearing.   HENT:      Head: Normocephalic.      Right Ear: Tympanic membrane, ear canal and external ear normal.      Left Ear: Tympanic membrane, ear canal and external ear normal.   Eyes:      Conjunctiva/sclera: Conjunctivae normal.      Pupils: Pupils are equal, round, and reactive to light.   Cardiovascular:      Rate and Rhythm: Normal rate and regular rhythm.      Pulses: Normal pulses.      Heart sounds: Murmur heard.   Pulmonary:      Effort: No respiratory distress.      Breath sounds: No wheezing, rhonchi or rales.   Abdominal:      General: Bowel sounds are normal. There is no distension.      Palpations: Abdomen is soft.      Tenderness: There is no abdominal tenderness.   Musculoskeletal:         General: No swelling or tenderness.   Skin:     Findings: No lesion or rash.   Neurological:      General: No focal deficit present.      Mental Status: She is alert and oriented to person, place, and time. Mental status is at baseline.      Gait: Gait normal.   Psychiatric:         Mood and Affect: Mood normal.         Behavior: Behavior normal.         Thought Content: Thought content normal.         Judgment: Judgment normal.       Cardiovascular risk discussed and, if needed, lifestyle modifications recommended, including nutritional choices, exercise, and elimination of habits contributing to risk.  We agreed on a plan to reduce current cardiovascular risk.  See the ASCVD risk  plus for data discussed regarding risk and risk reduction opportunities.  Aspirin use/disuse was discussed after reviewing updated guidelines.    Time Statement: Total face to face time spent on ASCVD was 5 minutes with 10 minutes spent in counseling, including the explanation.    Assessment/Plan   Problem List Items Addressed This Visit       Asthma     Hyperlipidemia    Essential hypertension    Low back pain    Aortic valve stenosis    Heart murmur    Osteoarthritis    Prediabetes    Vitamin D deficiency    Adjustment disorder with mixed anxiety and depressed mood     Other Visit Diagnoses       Healthcare maintenance    -  Primary    Routine general medical examination at health care facility        Relevant Orders    1 Year Follow Up In [facility] - PCP - Wellness Exam    Asymptomatic menopausal state        Relevant Orders    XR DEXA bone density    Encounter for screening mammogram for breast cancer        Relevant Orders    BI mammo bilateral screening tomosynthesis          1. Patient's blood work discussed at this office visit  2. LDL goal less than 100, current LDL 60  3. Triglyceride goal less than 150, current triglycerides 145, continue on type IV diet  4. Patient's vitamin D is back to normal, continue on vitamin D supplementation daily  5. Patient's hemoglobin A1c is 5.7, continue on no added sugar diet  6. Mammogram due 2024  7. DEXA 2015 osteopenia, due 2024  8. Colonoscopy Dr. Bah 2015 due 2025  9. Patient to call questions or concerns.

## 2024-10-18 NOTE — ACP (ADVANCE CARE PLANNING)
Confirming Previous Code Status:   Advance Care Planning Note     Discussion Date: 10/18/24   Discussion Participants: patient    The patient wishes to discuss Advance Care Planning today and the following is a brief summary of our discussion.     Patient has capacity to make their own medical decisions: Yes  Health Care Agent/Surrogate Decision Maker documented in chart: Yes    Documents on file and valid:  Advance Directive/Living Will: Yes   Health Care Power of : Yes  Other:     Communication of Medical Status/Prognosis:   stable    Communication of Treatment Goals/Options:   stable    Treatment Decisions  Goals of Care: survival is prioritized, if goals for quality or survival can reasonably be achieved     Follow Up Plan  stable  Team Members  stable  Time Statement: Total face to face time spent on advance care planning was 5 minutes with 16 minutes spent in counseling, including the explanation.    Becka Foreman DO  10/18/2024 8:49 AM

## 2024-10-21 DIAGNOSIS — M54.16 LUMBAR RADICULOPATHY: ICD-10-CM

## 2024-10-21 RX ORDER — GABAPENTIN 300 MG/1
300 CAPSULE ORAL DAILY
Qty: 90 CAPSULE | Refills: 1 | Status: SHIPPED | OUTPATIENT
Start: 2024-10-21 | End: 2024-10-22

## 2024-10-22 DIAGNOSIS — M54.16 LUMBAR RADICULOPATHY: ICD-10-CM

## 2024-10-22 RX ORDER — GABAPENTIN 300 MG/1
300 CAPSULE ORAL DAILY
Qty: 90 CAPSULE | Refills: 1 | Status: SHIPPED | OUTPATIENT
Start: 2024-10-22

## 2024-10-30 ENCOUNTER — HOSPITAL ENCOUNTER (OUTPATIENT)
Dept: RADIOLOGY | Facility: CLINIC | Age: 74
Discharge: HOME | End: 2024-10-30
Payer: MEDICARE

## 2024-10-30 DIAGNOSIS — Z78.0 ASYMPTOMATIC MENOPAUSAL STATE: ICD-10-CM

## 2024-10-30 PROCEDURE — 77080 DXA BONE DENSITY AXIAL: CPT

## 2024-10-30 PROCEDURE — 77080 DXA BONE DENSITY AXIAL: CPT | Performed by: STUDENT IN AN ORGANIZED HEALTH CARE EDUCATION/TRAINING PROGRAM

## 2024-11-10 DIAGNOSIS — I10 HYPERTENSION, UNSPECIFIED TYPE: ICD-10-CM

## 2024-11-10 DIAGNOSIS — M54.16 LUMBAR RADICULOPATHY: ICD-10-CM

## 2024-11-11 RX ORDER — LISINOPRIL 40 MG/1
40 TABLET ORAL DAILY
Qty: 90 TABLET | Refills: 1 | Status: SHIPPED | OUTPATIENT
Start: 2024-11-11

## 2024-11-11 RX ORDER — MELOXICAM 15 MG/1
15 TABLET ORAL DAILY
Qty: 90 TABLET | Refills: 0 | Status: SHIPPED | OUTPATIENT
Start: 2024-11-11

## 2024-11-15 ENCOUNTER — TELEPHONE (OUTPATIENT)
Dept: PRIMARY CARE | Facility: CLINIC | Age: 74
End: 2024-11-15
Payer: MEDICARE

## 2024-11-15 NOTE — TELEPHONE ENCOUNTER
Patient is scheduled for surgery 12-9, has a pre-op exam with you 11-26. Requesting a call back, she was in preadmission testing yesterday has a known heart murmur rama looked at her echo from 2019, they are asking for an updated echo from you or a cardiac clearance so they feel safe with the surgery.     [phone]

## 2024-11-26 ENCOUNTER — OFFICE VISIT (OUTPATIENT)
Dept: CARDIOLOGY | Facility: CLINIC | Age: 74
End: 2024-11-26
Payer: MEDICARE

## 2024-11-26 ENCOUNTER — TELEPHONE (OUTPATIENT)
Dept: CARDIOLOGY | Facility: CLINIC | Age: 74
End: 2024-11-26

## 2024-11-26 ENCOUNTER — HOSPITAL ENCOUNTER (OUTPATIENT)
Dept: CARDIOLOGY | Facility: CLINIC | Age: 74
Discharge: HOME | End: 2024-11-26
Payer: MEDICARE

## 2024-11-26 ENCOUNTER — APPOINTMENT (OUTPATIENT)
Dept: PRIMARY CARE | Facility: CLINIC | Age: 74
End: 2024-11-26
Payer: MEDICARE

## 2024-11-26 VITALS
SYSTOLIC BLOOD PRESSURE: 138 MMHG | BODY MASS INDEX: 44.22 KG/M2 | WEIGHT: 226.4 LBS | DIASTOLIC BLOOD PRESSURE: 66 MMHG | HEART RATE: 93 BPM

## 2024-11-26 VITALS
SYSTOLIC BLOOD PRESSURE: 96 MMHG | HEIGHT: 60 IN | HEART RATE: 108 BPM | BODY MASS INDEX: 44.35 KG/M2 | DIASTOLIC BLOOD PRESSURE: 49 MMHG | WEIGHT: 225.9 LBS

## 2024-11-26 DIAGNOSIS — I10 ESSENTIAL HYPERTENSION: ICD-10-CM

## 2024-11-26 DIAGNOSIS — N18.31 CHRONIC KIDNEY DISEASE, STAGE 3A (MULTI): ICD-10-CM

## 2024-11-26 DIAGNOSIS — M81.0 AGE RELATED OSTEOPOROSIS, UNSPECIFIED PATHOLOGICAL FRACTURE PRESENCE: ICD-10-CM

## 2024-11-26 DIAGNOSIS — E78.01 FAMILIAL HYPERCHOLESTEROLEMIA: ICD-10-CM

## 2024-11-26 DIAGNOSIS — E55.9 VITAMIN D DEFICIENCY: ICD-10-CM

## 2024-11-26 DIAGNOSIS — Z01.810 PREOPERATIVE CARDIOVASCULAR EXAMINATION: ICD-10-CM

## 2024-11-26 DIAGNOSIS — E66.9 OBESITY, UNSPECIFIED CLASS, UNSPECIFIED OBESITY TYPE, UNSPECIFIED WHETHER SERIOUS COMORBIDITY PRESENT: ICD-10-CM

## 2024-11-26 DIAGNOSIS — Z01.818 PREOP EXAMINATION: Primary | ICD-10-CM

## 2024-11-26 DIAGNOSIS — I35.0 NONRHEUMATIC AORTIC (VALVE) STENOSIS: ICD-10-CM

## 2024-11-26 DIAGNOSIS — R01.1 HEART MURMUR: ICD-10-CM

## 2024-11-26 DIAGNOSIS — I10 HYPERTENSION, UNSPECIFIED TYPE: ICD-10-CM

## 2024-11-26 DIAGNOSIS — I35.0 NONRHEUMATIC AORTIC VALVE STENOSIS: Primary | ICD-10-CM

## 2024-11-26 DIAGNOSIS — I35.0 AORTIC VALVE STENOSIS, ETIOLOGY OF CARDIAC VALVE DISEASE UNSPECIFIED: ICD-10-CM

## 2024-11-26 DIAGNOSIS — R94.31 ABNORMAL EKG: ICD-10-CM

## 2024-11-26 DIAGNOSIS — R73.03 PREDIABETES: ICD-10-CM

## 2024-11-26 DIAGNOSIS — F41.9 ANXIETY DISORDER, UNSPECIFIED TYPE: ICD-10-CM

## 2024-11-26 PROBLEM — M54.50 LOW BACK PAIN: Status: RESOLVED | Noted: 2023-04-07 | Resolved: 2024-11-26

## 2024-11-26 PROBLEM — R73.9 HYPERGLYCEMIA: Status: RESOLVED | Noted: 2023-04-07 | Resolved: 2024-11-26

## 2024-11-26 PROBLEM — R29.818 NEUROGENIC CLAUDICATION: Status: ACTIVE | Noted: 2023-04-07

## 2024-11-26 LAB
AORTIC VALVE MEAN GRADIENT: 24 MMHG
AORTIC VALVE PEAK VELOCITY: 3.3 M/S
AV PEAK GRADIENT: 44 MMHG
AVA (PEAK VEL): 1.01 CM2
AVA (VTI): 0.99 CM2
EJECTION FRACTION APICAL 4 CHAMBER: 69.4
EJECTION FRACTION: 70 %
LEFT ATRIUM VOLUME AREA LENGTH INDEX BSA: 28.1 ML/M2
LEFT VENTRICLE INTERNAL DIMENSION DIASTOLE: 3.98 CM (ref 3.5–6)
LEFT VENTRICULAR OUTFLOW TRACT DIAMETER: 1.88 CM
MITRAL VALVE E/A RATIO: 0.6
RIGHT VENTRICLE FREE WALL PEAK S': 17 CM/S
RIGHT VENTRICLE PEAK SYSTOLIC PRESSURE: 36.2 MMHG
TRICUSPID ANNULAR PLANE SYSTOLIC EXCURSION: 2.4 CM

## 2024-11-26 PROCEDURE — 1157F ADVNC CARE PLAN IN RCRD: CPT | Performed by: FAMILY MEDICINE

## 2024-11-26 PROCEDURE — 3075F SYST BP GE 130 - 139MM HG: CPT | Performed by: FAMILY MEDICINE

## 2024-11-26 PROCEDURE — 1159F MED LIST DOCD IN RCRD: CPT | Performed by: INTERNAL MEDICINE

## 2024-11-26 PROCEDURE — 1036F TOBACCO NON-USER: CPT | Performed by: FAMILY MEDICINE

## 2024-11-26 PROCEDURE — 99214 OFFICE O/P EST MOD 30 MIN: CPT | Performed by: FAMILY MEDICINE

## 2024-11-26 PROCEDURE — 1157F ADVNC CARE PLAN IN RCRD: CPT | Performed by: INTERNAL MEDICINE

## 2024-11-26 PROCEDURE — 93306 TTE W/DOPPLER COMPLETE: CPT

## 2024-11-26 PROCEDURE — 3074F SYST BP LT 130 MM HG: CPT | Performed by: INTERNAL MEDICINE

## 2024-11-26 PROCEDURE — 1159F MED LIST DOCD IN RCRD: CPT | Performed by: FAMILY MEDICINE

## 2024-11-26 PROCEDURE — 3078F DIAST BP <80 MM HG: CPT | Performed by: INTERNAL MEDICINE

## 2024-11-26 PROCEDURE — 93306 TTE W/DOPPLER COMPLETE: CPT | Performed by: INTERNAL MEDICINE

## 2024-11-26 PROCEDURE — 3078F DIAST BP <80 MM HG: CPT | Performed by: FAMILY MEDICINE

## 2024-11-26 PROCEDURE — 99204 OFFICE O/P NEW MOD 45 MIN: CPT | Performed by: INTERNAL MEDICINE

## 2024-11-26 PROCEDURE — 99214 OFFICE O/P EST MOD 30 MIN: CPT | Mod: 25 | Performed by: INTERNAL MEDICINE

## 2024-11-26 PROCEDURE — 1036F TOBACCO NON-USER: CPT | Performed by: INTERNAL MEDICINE

## 2024-11-26 PROCEDURE — 1123F ACP DISCUSS/DSCN MKR DOCD: CPT | Performed by: FAMILY MEDICINE

## 2024-11-26 PROCEDURE — 1160F RVW MEDS BY RX/DR IN RCRD: CPT | Performed by: INTERNAL MEDICINE

## 2024-11-26 PROCEDURE — 1123F ACP DISCUSS/DSCN MKR DOCD: CPT | Performed by: INTERNAL MEDICINE

## 2024-11-26 RX ORDER — VERAPAMIL HCL 240 MG
240 TABLET, EXTENDED RELEASE ORAL DAILY
Qty: 90 TABLET | Refills: 1 | Status: SHIPPED | OUTPATIENT
Start: 2024-11-26

## 2024-11-26 ASSESSMENT — ENCOUNTER SYMPTOMS
BACK PAIN: 0
COUGH: 0
LIGHT-HEADEDNESS: 0
FEVER: 0
FACIAL ASYMMETRY: 0
HEADACHES: 0
COLOR CHANGE: 0
ARTHRALGIAS: 0
APPETITE CHANGE: 0
ACTIVITY CHANGE: 0
PALPITATIONS: 0
CHOKING: 0
FATIGUE: 0
DIZZINESS: 0
CHEST TIGHTNESS: 0

## 2024-11-26 NOTE — PROGRESS NOTES
Moderate Chief Complaint:   Pre-op Clearance     History of Present Illness     Ghazal Mancini is a 74 y.o. female presenting with pre-operative cardiac evaluation before planned left [facility] 12/9/24.  The patient has no prior history of coronary artery disease, myocardial infarction, PCI (stent), CABG, heart failure, high-grade arrhythmias, pulmonary hypertension, peripheral arterial disease, or cerebrovascular disease.  The patient has no personal or family history of anesthetic complications during prior general anesthesia.  The patient has no history of DVT or PE.  Based upon the patient's present history, ambulation is limited due to joint pain, their functional capacity is greater than 4 METS.  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 Patients’ ability to expend >=4 METs can be assessed by estimates from activities of daily living; activities that expend >=4 METS include the ability to climb up a flight of stairs, walk up a hill, walk at ground level at 4 miles per hour, or perform heavy work around the house.  The patient denies chest pain or dyspnea on exertion.  The patient has no history of obstructive sleep apnea, alcohol abuse use, or tobacco use.  Pre-op laboratory studies were unremarkable.       Previous History     Past Medical History:  She has a past medical history of Acute upper respiratory infection, unspecified (06/21/2020), Decreased estrogen level (10/18/2024), Encounter for immunization (06/28/2016), Personal history of other diseases of the respiratory system (11/09/2016), Personal history of other specified conditions (03/26/2021), and Preoperative cardiovascular examination (11/26/2024).    Past Surgical History:  She has a past surgical history that includes Other surgical history (07/18/2022) and Other surgical history (06/12/2020).      Social History:  She reports that she has quit smoking. Her smoking use included cigarettes. She has never been exposed to tobacco smoke. She has  never used smokeless tobacco. She reports that she does not currently use alcohol. She reports that she does not use drugs.    Family History:  Family History   Problem Relation Name Age of Onset    Diabetes type II Mother          Passed at age 66    Heart disease Mother      Heart failure Father          Passed at age 60        Allergies:  Ether    Outpatient Medications:  Current Outpatient Medications   Medication Instructions    acetaminophen (TYLENOL) 650 mg, Every 8 hours PRN    albuterol 90 mcg/actuation inhaler INHALE 1 TO 2 PUFFS EVERY 4 TO 6 HOURS AS NEEDED.    amLODIPine (NORVASC) 2.5 mg, oral, Daily    atorvastatin (LIPITOR) 20 mg, oral, Nightly    buPROPion XL (WELLBUTRIN XL) 150 mg, oral, Daily before breakfast    cholecalciferol (VITAMIN D-3) 5,000 Units, Daily    citalopram (CELEXA) 20 mg, oral, Daily    fluticasone furoate-vilanteroL (Breo Ellipta) 200-25 mcg/dose inhaler 1 puff, Daily    gabapentin (NEURONTIN) 300 mg, oral, Daily    hydroCHLOROthiazide (HYDRODIURIL) 25 mg, oral, Daily    HYDROcodone-acetaminophen (Norco) 5-325 mg tablet 1 tablet    lisinopril 40 mg, oral, Daily    meloxicam (MOBIC) 15 mg, oral, Daily    miscellaneous medical supply (Blood Pressure Cuff) misc Check blood pressure first thing in the morning.    mometasone-formoterol (Dulera) 200-5 mcg/actuation inhaler 2 puffs, 2 times daily    nystatin (Mycostatin) 100,000 unit/gram powder Topical, 2 times daily    polyethylene glycol (GLYCOLAX, MIRALAX) 17 g, oral, Daily    Stool Softener-Laxative 8.6-50 mg tablet 1 tablet, Daily PRN    verapamil SR (CALAN-SR) 240 mg, oral, Daily, Do not crush or chew.       Physical Examination   Vitals:  Visit Vitals  BP (!) 96/49   Pulse 108   Ht 1.524 m (5')   Wt 102 kg (225 lb 14.4 oz)   BMI 44.12 kg/m²   Smoking Status Former   BSA 2.08 m²    Physical Exam  Vitals reviewed.   Constitutional:       General: She is not in acute distress.     Appearance: Normal appearance.   HENT:      Head:  Normocephalic and atraumatic.      Nose: Nose normal.   Eyes:      Conjunctiva/sclera: Conjunctivae normal.   Cardiovascular:      Rate and Rhythm: Normal rate and regular rhythm.      Pulses: Normal pulses.      Heart sounds: Murmur heard.      Systolic murmur is present with a grade of 2/6.   Pulmonary:      Effort: Pulmonary effort is normal. No respiratory distress.      Breath sounds: Normal breath sounds. No wheezing, rhonchi or rales.   Abdominal:      General: Bowel sounds are normal. There is no distension.      Palpations: Abdomen is soft.      Tenderness: There is no abdominal tenderness.   Musculoskeletal:         General: No swelling.      Right lower leg: No edema.      Left lower leg: No edema.   Skin:     General: Skin is warm and dry.      Capillary Refill: Capillary refill takes less than 2 seconds.   Neurological:      General: No focal deficit present.      Mental Status: She is alert.   Psychiatric:         Mood and Affect: Mood normal.             Labs/Imaging/Cardiac Studies     Last Labs:  CBC -  Lab Results   Component Value Date    WBC 5.9 10/16/2024    HGB 12.1 10/16/2024    HCT 36.0 10/16/2024    MCV 92 10/16/2024     10/16/2024       CMP -  Lab Results   Component Value Date    CALCIUM 9.6 10/16/2024    PROT 7.0 10/16/2024    ALBUMIN 4.5 10/16/2024    AST 17 10/16/2024    ALT 12 10/16/2024    ALKPHOS 71 10/16/2024    BILITOT 0.4 10/16/2024       LIPID PANEL -   Lab Results   Component Value Date    CHOL 131 10/16/2024    HDL 42.5 10/16/2024    CHHDL 3.1 10/16/2024    VLDL 29 10/16/2024    TRIG 145 10/16/2024    NHDL 89 10/16/2024       RENAL FUNCTION PANEL -   Lab Results   Component Value Date    K 4.7 10/16/2024       Lab Results   Component Value Date    HGBA1C 5.7 (H) 10/16/2024     Reviewed Echo   Reviewed ECG  Reviewed Labs    Echo:  No echocardiogram results found for the past 12 months       Assessment and Recommendations     Assessment/Plan       1. Nonrheumatic aortic  valve stenosis (Primary)  The patient has moderate aortic stenosis and remains asymptomatic.  There is no indication for AVR.  The patient will continue to have serial annual echocardiography and was counseled on the expected symptoms related to aortic stenosis.  Weight lifting restrictions reviewed.      2. Essential hypertension  The patient's blood pressure has been well-controlled at today's appointment to low or by recent primary care provider's measurements/home measurements and meets their goal blood pressure per the ACC/AHA guidelines.  The patient has been compliant with their anti-hypertensive medications and is following a low sodium/DASH diet. I advised continuation of their present medical treatment and lifestyle modification.    Stop amlodipine.    3. Familial hypercholesterolemia  The patient's lipids are well controlled on chronic atorvastatin therapy and they are meeting their goal LDL cholesterol per the ACC/AHA guidelines.        4. Preoperative cardiovascular examination  Based upon the above evaluation and The 2014 American College of Cardiology/American Heart Association (ACC/AHA) Guideline on Perioperative Cardiovascular Evaluation and Management for noncardiac surgery, and calculation of the Revised Cardiac Risk Index (RCRI) score=0, the patient is at low risk for an adverse cardiovascular event (ie, myocardial ischemia, myocardial infarction [MI], heart failure, arrhythmia, stroke, or cardiac death) for planned intermediate risk surgery.  No further cardiac testing is advised.       Ghazal Mancini will return in 1 year for an office visit and Echo.       Willie Mcintyre MD    Exclusive of any other services or procedures performed, I, Willie Mcintyre MD , spent 30 minutes in duration for this visit today.  This time consisted of chart review, obtaining history, and/or performing the exam as documented above as well as documenting the clinical information for the encounter in the electronic  record, discussing treatment options, plans, and/or goals with patient, family, and/or caregiver, refilling medications, updating the electronic record, ordering medicines, lab work, imaging, referrals, and/or procedures as documented above and communicating with other [facility] professionals. I have discussed the results of laboratory, radiology, and cardiology studies with the patient and their family/caregiver.

## 2024-11-26 NOTE — PROGRESS NOTES
Subjective   Patient ID: Ghazal Mancini is a 74 y.o. female who presents for Pre-op Exam (Pre-op testing this afternoon at 1:00 pm, Dr Mcintyre. (Echocardiogram today)).    HPI      Patient presents for pre-surgical clearance.      Fam Hx  Mom (65) , DMII, Rheumatic fever, valvular disease  Dad (60) , CHF, MI, HTN,      Exercise walks, mows  ETOH 2 drinks/year  Caffeine 2 cups coffee/ 2 soda  Tobacco 1 ppd x 40 years     Mammogram due   DEXA  osteopenia, due   Colonoscopy Dr. Bah 2015 due      Patient denies other complaints at this office visit.   Review of Systems   Constitutional:  Negative for activity change, appetite change, fatigue and fever.   HENT:  Negative for congestion.    Respiratory:  Negative for cough, choking and chest tightness.    Cardiovascular:  Negative for chest pain, palpitations and leg swelling.   Musculoskeletal:  Negative for arthralgias, back pain and gait problem.   Skin:  Negative for color change and pallor.   Neurological:  Negative for dizziness, facial asymmetry, light-headedness and headaches.       Objective   /66 (BP Location: Right arm, Patient Position: Sitting)   Pulse 93   Wt 103 kg (226 lb 6.4 oz)   BMI 44.22 kg/m²   BSA Body surface area is 2.09 meters squared.      Physical Exam  Constitutional:       General: She is not in acute distress.     Appearance: Normal appearance. She is not toxic-appearing.   HENT:      Head: Normocephalic.      Right Ear: Tympanic membrane, ear canal and external ear normal.      Left Ear: Tympanic membrane, ear canal and external ear normal.   Eyes:      Conjunctiva/sclera: Conjunctivae normal.      Pupils: Pupils are equal, round, and reactive to light.   Cardiovascular:      Rate and Rhythm: Normal rate and regular rhythm.      Pulses: Normal pulses.      Heart sounds: Murmur heard.   Pulmonary:      Effort: No respiratory distress.      Breath sounds: No wheezing, rhonchi or rales.   Abdominal:       General: Bowel sounds are normal. There is no distension.      Palpations: Abdomen is soft.      Tenderness: There is no abdominal tenderness.   Musculoskeletal:         General: No swelling or tenderness.   Skin:     Findings: No lesion or rash.   Neurological:      General: No focal deficit present.      Mental Status: She is alert and oriented to person, place, and time. Mental status is at baseline.      Gait: Gait normal.   Psychiatric:         Mood and Affect: Mood normal.         Behavior: Behavior normal.         Thought Content: Thought content normal.         Judgment: Judgment normal.       Lab on 10/16/2024   Component Date Value Ref Range Status    WBC 10/16/2024 5.9  4.4 - 11.3 x10*3/uL Final    nRBC 10/16/2024 0.0  0.0 - 0.0 /100 WBCs Final    RBC 10/16/2024 3.91 (L)  4.00 - 5.20 x10*6/uL Final    Hemoglobin 10/16/2024 12.1  12.0 - 16.0 g/dL Final    Hematocrit 10/16/2024 36.0  36.0 - 46.0 % Final    MCV 10/16/2024 92  80 - 100 fL Final    MCH 10/16/2024 30.9  26.0 - 34.0 pg Final    MCHC 10/16/2024 33.6  32.0 - 36.0 g/dL Final    RDW 10/16/2024 12.8  11.5 - 14.5 % Final    Platelets 10/16/2024 330  150 - 450 x10*3/uL Final    Neutrophils % 10/16/2024 55.4  40.0 - 80.0 % Final    Immature Granulocytes %, Automated 10/16/2024 0.2  0.0 - 0.9 % Final    Immature Granulocyte Count (IG) includes promyelocytes, myelocytes and metamyelocytes but does not include bands. Percent differential counts (%) should be interpreted in the context of the absolute cell counts (cells/UL).    Lymphocytes % 10/16/2024 30.6  13.0 - 44.0 % Final    Monocytes % 10/16/2024 9.6  2.0 - 10.0 % Final    Eosinophils % 10/16/2024 3.2  0.0 - 6.0 % Final    Basophils % 10/16/2024 1.0  0.0 - 2.0 % Final    Neutrophils Absolute 10/16/2024 3.28  1.60 - 5.50 x10*3/uL Final    Percent differential counts (%) should be interpreted in the context of the absolute cell counts (cells/uL).    Immature Granulocytes Absolute, Au* 10/16/2024  0.01  0.00 - 0.50 x10*3/uL Final    Lymphocytes Absolute 10/16/2024 1.81  0.80 - 3.00 x10*3/uL Final    Monocytes Absolute 10/16/2024 0.57  0.05 - 0.80 x10*3/uL Final    Eosinophils Absolute 10/16/2024 0.19  0.00 - 0.40 x10*3/uL Final    Basophils Absolute 10/16/2024 0.06  0.00 - 0.10 x10*3/uL Final    Glucose 10/16/2024 96  74 - 99 mg/dL Final    Sodium 10/16/2024 137  136 - 145 mmol/L Final    Potassium 10/16/2024 4.7  3.5 - 5.3 mmol/L Final    Chloride 10/16/2024 100  98 - 107 mmol/L Final    Bicarbonate 10/16/2024 27  21 - 32 mmol/L Final    Anion Gap 10/16/2024 15  10 - 20 mmol/L Final    Urea Nitrogen 10/16/2024 26 (H)  6 - 23 mg/dL Final    Creatinine 10/16/2024 1.04  0.50 - 1.05 mg/dL Final    eGFR 10/16/2024 56 (L)  >60 mL/min/1.73m*2 Final    Calculations of estimated GFR are performed using the 2021 CKD-EPI Study Refit equation without the race variable for the IDMS-Traceable creatinine methods.  https://jasn.asnjournals.org/content/early/2021/09/22/ASN.4815364894    Calcium 10/16/2024 9.6  8.6 - 10.6 mg/dL Final    Albumin 10/16/2024 4.5  3.4 - 5.0 g/dL Final    Alkaline Phosphatase 10/16/2024 71  33 - 136 U/L Final    Total Protein 10/16/2024 7.0  6.4 - 8.2 g/dL Final    AST 10/16/2024 17  9 - 39 U/L Final    Bilirubin, Total 10/16/2024 0.4  0.0 - 1.2 mg/dL Final    ALT 10/16/2024 12  7 - 45 U/L Final    Patients treated with Sulfasalazine may generate falsely decreased results for ALT.    Cholesterol 10/16/2024 131  0 - 199 mg/dL Final          Age      Desirable   Borderline High   High     0-19 Y     0 - 169       170 - 199     >/= 200    20-24 Y     0 - 189       190 - 224     >/= 225         >24 Y     0 - 199       200 - 239     >/= 240   **All ranges are based on fasting samples. Specific   therapeutic targets will vary based on patient-specific   cardiac risk.    Pediatric guidelines reference:Pediatrics 2011, 128(S5).Adult guidelines reference: NCEP ATPIII Guidelines,PALOMO 2001,  258:2486-97    Venipuncture immediately after or during the administration of Metamizole may lead to falsely low results. Testing should be performed immediately prior to Metamizole dosing.    HDL-Cholesterol 10/16/2024 42.5  mg/dL Final      Age       Very Low   Low     Normal    High    0-19 Y    < 35      < 40     40-45     ----  20-24 Y    ----     < 40      >45      ----        >24 Y      ----     < 40     40-60      >60      Cholesterol/HDL Ratio 10/16/2024 3.1   Final      Ref Values  Desirable  < 3.4  High Risk  > 5.0    LDL Calculated 10/16/2024 60  <=99 mg/dL Final                                Near   Borderline      AGE      Desirable  Optimal    High     High     Very High     0-19 Y     0 - 109     ---    110-129   >/= 130     ----    20-24 Y     0 - 119     ---    120-159   >/= 160     ----      >24 Y     0 -  99   100-129  130-159   160-189     >/=190      VLDL 10/16/2024 29  0 - 40 mg/dL Final    Triglycerides 10/16/2024 145  0 - 149 mg/dL Final       Age         Desirable   Borderline High   High     Very High   0 D-90 D    19 - 174         ----         ----        ----  91 D- 9 Y     0 -  74        75 -  99     >/= 100      ----    10-19 Y     0 -  89        90 - 129     >/= 130      ----    20-24 Y     0 - 114       115 - 149     >/= 150      ----         >24 Y     0 - 149       150 - 199    200- 499    >/= 500    Venipuncture immediately after or during the administration of Metamizole may lead to falsely low results. Testing should be performed immediately prior to Metamizole dosing.    Non HDL Cholesterol 10/16/2024 89  0 - 149 mg/dL Final          Age       Desirable   Borderline High   High     Very High     0-19 Y     0 - 119       120 - 144     >/= 145    >/= 160    20-24 Y     0 - 149       150 - 189     >/= 190      ----         >24 Y    30 mg/dL above LDL Cholesterol goal      Thyroid Stimulating Hormone 10/16/2024 1.49  0.44 - 3.98 mIU/L Final    Vitamin D, 25-Hydroxy, Total 10/16/2024  64  30 - 100 ng/mL Final    Hemoglobin A1C 10/16/2024 5.7 (H)  See comment % Final    Estimated Average Glucose 10/16/2024 117  Not Established mg/dL Final     Current Outpatient Medications on File Prior to Visit   Medication Sig Dispense Refill    acetaminophen (Tylenol) 500 mg tablet Take 650 mg by mouth every 8 hours if needed.      albuterol 90 mcg/actuation inhaler INHALE 1 TO 2 PUFFS EVERY 4 TO 6 HOURS AS NEEDED.      amLODIPine (Norvasc) 2.5 mg tablet Take 1 tablet (2.5 mg) by mouth once daily. 90 tablet 3    atorvastatin (Lipitor) 20 mg tablet Take 1 tablet (20 mg) by mouth once daily at bedtime. 90 tablet 3    buPROPion XL (Wellbutrin XL) 150 mg 24 hr tablet Take 1 tablet (150 mg) by mouth once daily in the morning. Take before meals. 90 tablet 1    cholecalciferol (Vitamin D-3) 5,000 Units tablet Take 1 tablet (5,000 Units) by mouth once daily.      citalopram (CeleXA) 20 mg tablet Take 1 tablet (20 mg) by mouth once daily. 90 tablet 3    fluticasone furoate-vilanteroL (Breo Ellipta) 200-25 mcg/dose inhaler Inhale 1 puff once daily.      gabapentin (Neurontin) 300 mg capsule TAKE 1 CAPSULE BY MOUTH ONCE DAILY 90 capsule 1    hydroCHLOROthiazide (HYDRODiuril) 25 mg tablet Take 1 tablet (25 mg) by mouth once daily. 90 tablet 3    HYDROcodone-acetaminophen (Norco) 5-325 mg tablet Take 1 tablet by mouth. Two to three times daily      lisinopril 40 mg tablet TAKE ONE TABLET BY MOUTH EVERY DAY 90 tablet 1    meloxicam (Mobic) 15 mg tablet TAKE ONE TABLET BY MOUTH EVERY DAY 90 tablet 0    miscellaneous medical supply (Blood Pressure Cuff) misc Check blood pressure first thing in the morning. 1 each 0    mometasone-formoterol (Dulera) 200-5 mcg/actuation inhaler Inhale 2 puffs twice a day.      nystatin (Mycostatin) 100,000 unit/gram powder Apply topically 2 times a day. 60 g 3    polyethylene glycol (Glycolax) 17 gram/dose powder Take 17 g by mouth once daily. 595 g 0    Stool Softener-Laxative 8.6-50 mg tablet  Take 1 tablet by mouth once daily as needed.      verapamil ER (Veralan PM) 240 mg 24 hr capsule Take 1 capsule (240 mg) by mouth once daily. 30 capsule 0     No current facility-administered medications on file prior to visit.     No images are attached to the encounter.            Assessment/Plan   Diagnoses and all orders for this visit:  Preop examination  Heart murmur  Essential hypertension  Familial hypercholesterolemia  Aortic valve stenosis, etiology of cardiac valve disease unspecified  Vitamin D deficiency  Prediabetes  Obesity, unspecified class, unspecified obesity type, unspecified whether serious comorbidity present  Age related osteoporosis, unspecified pathological fracture presence  Chronic kidney disease, stage 3a (Multi)  Anxiety disorder, unspecified type    Patient to have echocardiogram performed  Patient will have cardiac clearance  Medically patient is moderate risk for a moderate risk surgery but will need cardiac clearance for her known heart murmur  Patient to call if questions or concerns

## 2024-12-09 ENCOUNTER — TELEPHONE (OUTPATIENT)
Dept: CARDIOLOGY | Facility: CLINIC | Age: 74
End: 2024-12-09

## 2025-02-24 DIAGNOSIS — E78.5 HYPERLIPIDEMIA, UNSPECIFIED HYPERLIPIDEMIA TYPE: ICD-10-CM

## 2025-02-24 RX ORDER — ATORVASTATIN CALCIUM 20 MG/1
20 TABLET, FILM COATED ORAL NIGHTLY
Qty: 90 TABLET | Refills: 3 | Status: SHIPPED | OUTPATIENT
Start: 2025-02-24

## 2025-02-25 DIAGNOSIS — F41.9 ANXIETY DISORDER, UNSPECIFIED TYPE: ICD-10-CM

## 2025-02-25 RX ORDER — CITALOPRAM 20 MG/1
20 TABLET, FILM COATED ORAL DAILY
Qty: 90 TABLET | Refills: 3 | Status: SHIPPED | OUTPATIENT
Start: 2025-02-25

## 2025-04-09 ENCOUNTER — APPOINTMENT (OUTPATIENT)
Dept: RADIOLOGY | Facility: CLINIC | Age: 75
End: 2025-04-09
Payer: MEDICARE

## 2025-05-26 DIAGNOSIS — I10 HYPERTENSION, UNSPECIFIED TYPE: ICD-10-CM

## 2025-05-27 RX ORDER — VERAPAMIL HCL 240 MG
TABLET, EXTENDED RELEASE ORAL
Qty: 90 TABLET | Refills: 0 | Status: SHIPPED | OUTPATIENT
Start: 2025-05-27

## 2025-06-09 DIAGNOSIS — Z00.00 HEALTHCARE MAINTENANCE: ICD-10-CM

## 2025-06-09 RX ORDER — NYSTATIN 100000 [USP'U]/G
1 POWDER TOPICAL 2 TIMES DAILY
Qty: 30 G | Refills: 3 | Status: SHIPPED | OUTPATIENT
Start: 2025-06-09 | End: 2026-06-09

## 2025-06-19 DIAGNOSIS — I10 HYPERTENSION, UNSPECIFIED TYPE: ICD-10-CM

## 2025-06-19 RX ORDER — HYDROCHLOROTHIAZIDE 25 MG/1
25 TABLET ORAL DAILY
Qty: 90 TABLET | Refills: 3 | Status: SHIPPED | OUTPATIENT
Start: 2025-06-19

## 2025-06-23 DIAGNOSIS — I10 HYPERTENSION, UNSPECIFIED TYPE: ICD-10-CM

## 2025-06-23 RX ORDER — LISINOPRIL 40 MG/1
40 TABLET ORAL DAILY
Qty: 90 TABLET | Refills: 1 | Status: SHIPPED | OUTPATIENT
Start: 2025-06-23

## 2025-06-27 DIAGNOSIS — F41.1 GENERALIZED ANXIETY DISORDER: ICD-10-CM

## 2025-06-27 RX ORDER — BUPROPION HYDROCHLORIDE 150 MG/1
TABLET ORAL
Qty: 90 TABLET | Refills: 1 | Status: SHIPPED | OUTPATIENT
Start: 2025-06-27

## 2025-07-02 DIAGNOSIS — M54.16 LUMBAR RADICULOPATHY: ICD-10-CM

## 2025-07-02 RX ORDER — MELOXICAM 15 MG/1
15 TABLET ORAL DAILY
Qty: 90 TABLET | Refills: 1 | Status: SHIPPED | OUTPATIENT
Start: 2025-07-02

## 2025-07-23 ENCOUNTER — TELEPHONE (OUTPATIENT)
Dept: PRIMARY CARE | Facility: CLINIC | Age: 75
End: 2025-07-23
Payer: MEDICARE

## 2025-09-05 DIAGNOSIS — I10 HYPERTENSION, UNSPECIFIED TYPE: ICD-10-CM

## 2025-09-05 RX ORDER — VERAPAMIL HCL 240 MG
240 TABLET, EXTENDED RELEASE ORAL DAILY
Qty: 90 TABLET | Refills: 1 | Status: SHIPPED | OUTPATIENT
Start: 2025-09-05

## 2025-10-20 ENCOUNTER — APPOINTMENT (OUTPATIENT)
Dept: PRIMARY CARE | Facility: CLINIC | Age: 75
End: 2025-10-20
Payer: MEDICARE